# Patient Record
Sex: FEMALE | Race: WHITE | Employment: OTHER | URBAN - METROPOLITAN AREA
[De-identification: names, ages, dates, MRNs, and addresses within clinical notes are randomized per-mention and may not be internally consistent; named-entity substitution may affect disease eponyms.]

---

## 2018-01-01 ENCOUNTER — APPOINTMENT (OUTPATIENT)
Dept: GENERAL RADIOLOGY | Age: 68
End: 2018-01-01
Attending: EMERGENCY MEDICINE
Payer: MEDICARE

## 2018-01-01 ENCOUNTER — HOSPITAL ENCOUNTER (EMERGENCY)
Age: 68
Discharge: HOME | End: 2018-09-16
Attending: EMERGENCY MEDICINE
Payer: MEDICARE

## 2018-01-01 ENCOUNTER — APPOINTMENT (OUTPATIENT)
Dept: CT IMAGING | Age: 68
End: 2018-01-01
Attending: EMERGENCY MEDICINE
Payer: MEDICARE

## 2018-01-01 VITALS
OXYGEN SATURATION: 99 % | DIASTOLIC BLOOD PRESSURE: 76 MMHG | SYSTOLIC BLOOD PRESSURE: 134 MMHG | BODY MASS INDEX: 17.72 KG/M2 | RESPIRATION RATE: 18 BRPM | TEMPERATURE: 97.5 F | WEIGHT: 100 LBS | HEART RATE: 74 BPM | HEIGHT: 63 IN

## 2018-01-01 DIAGNOSIS — M54.2 NECK PAIN: ICD-10-CM

## 2018-01-01 DIAGNOSIS — S01.01XA LACERATION OF SCALP, INITIAL ENCOUNTER: Primary | ICD-10-CM

## 2018-01-01 DIAGNOSIS — S09.90XA CLOSED HEAD INJURY, INITIAL ENCOUNTER: ICD-10-CM

## 2018-01-01 PROCEDURE — 99282 EMERGENCY DEPT VISIT SF MDM: CPT | Performed by: EMERGENCY MEDICINE

## 2018-01-01 PROCEDURE — 70450 CT HEAD/BRAIN W/O DYE: CPT

## 2018-01-01 PROCEDURE — 72040 X-RAY EXAM NECK SPINE 2-3 VW: CPT

## 2018-01-01 RX ORDER — METOPROLOL TARTRATE 25 MG/1
12.5 TABLET, FILM COATED ORAL 2 TIMES DAILY
COMMUNITY

## 2018-09-16 NOTE — ED NOTES
Patient  at bedside stating he is ready to go and just needs help getting patient into car. Patient was assisted into wheelchair and  rolled patient from facility, did not wait for discharge paperwork.

## 2018-09-16 NOTE — DISCHARGE INSTRUCTIONS
Head Injury: After Your Visit to the Emergency Room  Your Care Instructions  You were seen in the emergency room for a head injury. Have another adult watch you closely for the next 24 hours. Ask the person to watch for signs that your head injury is getting worse, and make sure that he or she knows what to do if these signs appear. Even though you have been released from the emergency room, you still need to watch for any problems. The doctor carefully checked you. But sometimes problems can develop later. If you have new symptoms, or if your symptoms do not get better, return to the emergency room or call your doctor right away. A concussion means you hit your head hard enough to injure your brain. If you had a concussion, you may have symptoms that last for a few days to months. You may have changes in how well you think, concentrate, or remember; changes in your sleep patterns; or other symptoms. Although this is common after a concussion, any symptoms that are new or that are getting worse could be signs of a more serious problem. A visit to the emergency room is only one step in your treatment. Even if you feel better, you still need to do what your doctor recommends, such as going to all suggested follow-up appointments and taking medicines exactly as directed. This will help you recover and help prevent future problems. How can you care for yourself at home? · Take it easy for the next few days, or longer if you are not feeling well. Do not try to do too much. · Get plenty of sleep at night. Try to rest during the day. · Ask your doctor if you can take an over-the-counter pain medicine, such as acetaminophen (Tylenol), ibuprofen (Advil, Motrin), or naproxen (Aleve). Read and follow all instructions on the label. Do not take two or more pain medicines at the same time unless the doctor told you to. Many pain medicines have acetaminophen, which is Tylenol.  Too much acetaminophen (Tylenol) can be harmful. · Put ice or a cold pack on the area for 10 to 20 minutes at a time. Put a thin cloth between the ice and your skin. · Do not drink any alcohol for 24 hours or until your doctor tells you it is okay. · Avoid activities that could lead to another head injury. Avoid contact sports until your doctor says that you can do them. An athlete should never go back into a game after a head injury. · Until your doctor says it is okay, do not drive or do other things that require you to be alert. When should you call for help? Call 911 if:  · You have twitching, jerking, or a seizure. · You passed out (lost consciousness). · You have other symptoms that you think are a medical emergency. Return to the emergency room now if:  · You continue to vomit for more than 2 hours, or you have new vomiting. · You have clear or bloody fluid coming from your nose or ears. · The person checking on you has a hard time waking you. · You are confused, cannot think clearly, or do not know where you are. · You have trouble walking or talking. · You have new weakness or numbness in any part of your body. · You have bruises around both eyes (\"raccoon eyes\"). Call your doctor today if:  · Your headaches get worse. Where can you learn more? Go to [email or web address]  Enter C324 in the search box to learn more about \"Head Injury: After Your Visit to the Emergency Room. \"   © 8868-5846 [employer], [employer]. Care instructions adapted under license by Dav [employer] (which disclaims liability or warranty for this information). This care instruction is for use with your licensed healthcare professional. If you have questions about a medical condition or this instruction, always ask your healthcare professional. [place] [address] any warranty or liability for your use of this information. Content Version: 9.4.59715;  Last Revised: July 27, 2010        Xr Spine Cerv Pa Lat [place] 3 V Max    Result Date: 9/16/2018  Cervical spine Clinical indication: Acute moderate pain after fall injury. Multiple sclerosis. Comparison: Thoracic spinal 11/10/2014 correlation Technique: 5 views. Technologist reports best possible imaging given some patient difficulty with positioning. Findings: There is no evidence of fracture, dislocation, or erosion. The prevertebral soft tissues are normal. The vertebral heights, disc spaces, alignment are maintained. Mild multilevel degenerative changes are not unusual for age. The partially imaged lung apices are clear. There is partial visualization of left side pacemaker. Overall bone density is likely low. IMPRESSION: No acute osseous abnormality. Ct Head Wo Cont    Result Date: 9/16/2018  Noncontrast head CT Clinical Indication: Acute fall with frontal head and facial pain, abrasions, lacerations. History of brain surgery for meningioma. Technique: Noncontrast axial images were obtained through the brain. All CT scans at this location are performed using dose modulation techniques as appropriate including the following: Automated exposure control, adjustment of the MA and/or kV according to patient's size, or use of iterative reconstruction technique. Comparison: None available Findings: There is no acute intracranial hemorrhage, hydrocephalus, intra-axial mass, or mass-effect. A 3.5 cm smooth well-defined area of peripheral low attenuation in the anterior left frontal lobe subjacent to a craniotomy defect. There is no CT evidence of acute large artery territorial infarction or abnormal extra-axial fluid collection. Mild generalized atrophy is noted. There are scattered mild areas of nonspecific low attenuation involving supratentorial white matter. The mastoid air cells and paranasal sinuses are clear where imaged. No displaced skull fractures are present. Small area of frontal scalp soft tissue swelling is noted. Impression: 1. No acute hemorrhage.  2. Left frontal peripheral hypodensity and craniotomy, likely sequelae of reported meningioma resection. No prior imaging is available to ensure stability. 3. Atrophy, mild chronic microvascular ischemic changes.

## 2018-09-16 NOTE — ED TRIAGE NOTES
Patient arrives from home, via EMS, complaining of fall. Patient was sitting on her toilet, when she fell forward hitting her face on the wall in front of her, causing laceration and abrasions. Patient denies losing consciousness. Also reports pain to left knee and left shoulder pain. Denies use of blood thinners.

## 2018-10-17 NOTE — ED PROVIDER NOTES
68-year-old female fell forward and struck her head No LOC No confusion or vomiting Happened shortly before arrival 
 
History from the patient and her  Fall Pertinent negatives include no nausea and no vomiting. Past Medical History:  
Diagnosis Date  Ill-defined condition   
 muscular dystrophy  Ill-defined condition Atrial fibrilation Past Surgical History:  
Procedure Laterality Date  ABDOMEN SURGERY PROC UNLISTED    
 laparotomy  HX  SECTION    
 HX ORTHOPAEDIC    
 left ankle, neck  HX OTHER SURGICAL    
 parotid  surgury  NEUROLOGICAL PROCEDURE UNLISTED    
 meningioma History reviewed. No pertinent family history. Social History Socioeconomic History  Marital status:  Spouse name: Not on file  Number of children: Not on file  Years of education: Not on file  Highest education level: Not on file Social Needs  Financial resource strain: Not on file  Food insecurity - worry: Not on file  Food insecurity - inability: Not on file  Transportation needs - medical: Not on file  Transportation needs - non-medical: Not on file Occupational History  Not on file Tobacco Use  Smoking status: Not on file Substance and Sexual Activity  Alcohol use: No  
 Drug use: No  
 Sexual activity: Not on file Other Topics Concern  Not on file Social History Narrative  Not on file ALLERGIES: Morphine Review of Systems Constitutional: Negative. Respiratory: Negative for shortness of breath. Cardiovascular: Negative for chest pain. Gastrointestinal: Negative for nausea and vomiting. Vitals:  
 18 3830 BP: 134/76 Pulse: 74 Resp: 18 Temp: 97.5 °F (36.4 °C) SpO2: 99% Weight: 45.4 kg (100 lb) Height: 5' 3\" (1.6 m) Physical Exam  
Constitutional: She appears well-developed and well-nourished. HENT:  
llaceration scalp Eyes: EOM are normal. Pupils are equal, round, and reactive to light. Cardiovascular: Normal rate and regular rhythm. Pulmonary/Chest: No respiratory distress. Musculoskeletal: She exhibits no deformity. Neurological: She is alert. Skin: Skin is warm. MDM Number of Diagnoses or Management Options Closed head injury, initial encounter:  
Laceration of scalp, initial encounter:  
Neck pain:  
Diagnosis management comments: Patient with full head injury. Scalp laceration CT is performed. X-ray of the cervical spine CT and x-ray are negative for fracture Laceration closed-- Anesthesia with 1% lidocaine. Amount and/or Complexity of Data Reviewed Tests in the radiology section of CPT®: reviewed 
 
 
 discharge home Procedures

## 2019-01-01 ENCOUNTER — APPOINTMENT (OUTPATIENT)
Dept: GENERAL RADIOLOGY | Age: 69
DRG: 070 | End: 2019-01-01
Attending: INTERNAL MEDICINE
Payer: MEDICARE

## 2019-01-01 ENCOUNTER — APPOINTMENT (OUTPATIENT)
Dept: CT IMAGING | Age: 69
DRG: 070 | End: 2019-01-01
Attending: INTERNAL MEDICINE
Payer: MEDICARE

## 2019-01-01 ENCOUNTER — APPOINTMENT (OUTPATIENT)
Dept: MRI IMAGING | Age: 69
DRG: 070 | End: 2019-01-01
Attending: NURSE PRACTITIONER
Payer: MEDICARE

## 2019-01-01 ENCOUNTER — HOSPITAL ENCOUNTER (INPATIENT)
Age: 69
LOS: 5 days | DRG: 070 | End: 2019-06-13
Attending: STUDENT IN AN ORGANIZED HEALTH CARE EDUCATION/TRAINING PROGRAM | Admitting: INTERNAL MEDICINE
Payer: MEDICARE

## 2019-01-01 ENCOUNTER — APPOINTMENT (OUTPATIENT)
Dept: CT IMAGING | Age: 69
DRG: 070 | End: 2019-01-01
Attending: STUDENT IN AN ORGANIZED HEALTH CARE EDUCATION/TRAINING PROGRAM
Payer: MEDICARE

## 2019-01-01 ENCOUNTER — APPOINTMENT (OUTPATIENT)
Dept: GENERAL RADIOLOGY | Age: 69
DRG: 070 | End: 2019-01-01
Attending: STUDENT IN AN ORGANIZED HEALTH CARE EDUCATION/TRAINING PROGRAM
Payer: MEDICARE

## 2019-01-01 VITALS
TEMPERATURE: 98.3 F | DIASTOLIC BLOOD PRESSURE: 94 MMHG | WEIGHT: 125.88 LBS | HEART RATE: 98 BPM | BODY MASS INDEX: 22.3 KG/M2 | OXYGEN SATURATION: 99 % | HEIGHT: 63 IN | SYSTOLIC BLOOD PRESSURE: 164 MMHG | RESPIRATION RATE: 34 BRPM

## 2019-01-01 DIAGNOSIS — I46.9 CARDIAC ARREST (HCC): ICD-10-CM

## 2019-01-01 DIAGNOSIS — Z71.89 ADVANCED DIRECTIVES, COUNSELING/DISCUSSION: ICD-10-CM

## 2019-01-01 DIAGNOSIS — Z51.5 ENCOUNTER FOR PALLIATIVE CARE: ICD-10-CM

## 2019-01-01 DIAGNOSIS — R56.9 SEIZURE (HCC): ICD-10-CM

## 2019-01-01 DIAGNOSIS — G80.8 OTHER CEREBRAL PALSY (HCC): Chronic | ICD-10-CM

## 2019-01-01 DIAGNOSIS — G93.89 BRAIN MASS: ICD-10-CM

## 2019-01-01 DIAGNOSIS — J98.2 PNEUMOMEDIASTINUM (HCC): ICD-10-CM

## 2019-01-01 DIAGNOSIS — R41.82 ALTERED MENTAL STATUS, UNSPECIFIED ALTERED MENTAL STATUS TYPE: Primary | ICD-10-CM

## 2019-01-01 DIAGNOSIS — J93.9 PNEUMOTHORAX ON RIGHT: ICD-10-CM

## 2019-01-01 DIAGNOSIS — J96.01 ACUTE RESPIRATORY FAILURE WITH HYPOXIA (HCC): ICD-10-CM

## 2019-01-01 DIAGNOSIS — G71.11 MYOTONIC DYSTROPHY (HCC): ICD-10-CM

## 2019-01-01 DIAGNOSIS — R53.81 DEBILITY: ICD-10-CM

## 2019-01-01 DIAGNOSIS — G93.40 ACUTE ENCEPHALOPATHY: ICD-10-CM

## 2019-01-01 DIAGNOSIS — J96.00 ACUTE RESPIRATORY FAILURE, UNSPECIFIED WHETHER WITH HYPOXIA OR HYPERCAPNIA (HCC): ICD-10-CM

## 2019-01-01 DIAGNOSIS — I95.9 HYPOTENSION, UNSPECIFIED HYPOTENSION TYPE: ICD-10-CM

## 2019-01-01 LAB
ALBUMIN SERPL-MCNC: 3.2 G/DL (ref 3.2–4.6)
ALBUMIN/GLOB SERPL: 1 {RATIO} (ref 1.2–3.5)
ALP SERPL-CCNC: 61 U/L (ref 50–136)
ALT SERPL-CCNC: 15 U/L (ref 12–65)
ANION GAP SERPL CALC-SCNC: 10 MMOL/L (ref 7–16)
ANION GAP SERPL CALC-SCNC: 11 MMOL/L (ref 7–16)
ANION GAP SERPL CALC-SCNC: 11 MMOL/L (ref 7–16)
ANION GAP SERPL CALC-SCNC: 12 MMOL/L (ref 7–16)
ANION GAP SERPL CALC-SCNC: 13 MMOL/L (ref 7–16)
ANION GAP SERPL CALC-SCNC: 5 MMOL/L (ref 7–16)
ANION GAP SERPL CALC-SCNC: 7 MMOL/L (ref 7–16)
ARTERIAL PATENCY WRIST A: ABNORMAL
ARTERIAL PATENCY WRIST A: ABNORMAL
ARTERIAL PATENCY WRIST A: YES
AST SERPL-CCNC: 18 U/L (ref 15–37)
ATRIAL RATE: 122 BPM
ATRIAL RATE: 159 BPM
ATRIAL RATE: 416 BPM
BASE DEFICIT BLD-SCNC: 10 MMOL/L
BASE DEFICIT BLD-SCNC: 11 MMOL/L
BASE DEFICIT BLD-SCNC: 2 MMOL/L
BASE DEFICIT BLD-SCNC: 6 MMOL/L
BASE DEFICIT BLD-SCNC: 8 MMOL/L
BASE DEFICIT BLD-SCNC: 8 MMOL/L
BASOPHILS # BLD: 0 K/UL (ref 0–0.2)
BASOPHILS # BLD: 0.1 K/UL (ref 0–0.2)
BASOPHILS NFR BLD: 0 % (ref 0–2)
BASOPHILS NFR BLD: 1 % (ref 0–2)
BDY SITE: ABNORMAL
BILIRUB SERPL-MCNC: 0.6 MG/DL (ref 0.2–1.1)
BODY TEMPERATURE: 98.6
BUN SERPL-MCNC: 10 MG/DL (ref 8–23)
BUN SERPL-MCNC: 10 MG/DL (ref 8–23)
BUN SERPL-MCNC: 11 MG/DL (ref 8–23)
BUN SERPL-MCNC: 11 MG/DL (ref 8–23)
BUN SERPL-MCNC: 14 MG/DL (ref 8–23)
BUN SERPL-MCNC: 15 MG/DL (ref 8–23)
BUN SERPL-MCNC: 8 MG/DL (ref 8–23)
CALCIUM SERPL-MCNC: 7.9 MG/DL (ref 8.3–10.4)
CALCIUM SERPL-MCNC: 8 MG/DL (ref 8.3–10.4)
CALCIUM SERPL-MCNC: 8.3 MG/DL (ref 8.3–10.4)
CALCIUM SERPL-MCNC: 8.4 MG/DL (ref 8.3–10.4)
CALCIUM SERPL-MCNC: 8.5 MG/DL (ref 8.3–10.4)
CALCIUM SERPL-MCNC: 8.7 MG/DL (ref 8.3–10.4)
CALCIUM SERPL-MCNC: 9.4 MG/DL (ref 8.3–10.4)
CALCULATED P AXIS, ECG09: 106 DEGREES
CALCULATED R AXIS, ECG10: -125 DEGREES
CALCULATED R AXIS, ECG10: -61 DEGREES
CALCULATED R AXIS, ECG10: 54 DEGREES
CALCULATED T AXIS, ECG11: 102 DEGREES
CALCULATED T AXIS, ECG11: 51 DEGREES
CALCULATED T AXIS, ECG11: 92 DEGREES
CHLORIDE SERPL-SCNC: 105 MMOL/L (ref 98–107)
CHLORIDE SERPL-SCNC: 110 MMOL/L (ref 98–107)
CHLORIDE SERPL-SCNC: 111 MMOL/L (ref 98–107)
CHLORIDE SERPL-SCNC: 112 MMOL/L (ref 98–107)
CHLORIDE SERPL-SCNC: 114 MMOL/L (ref 98–107)
CO2 BLD-SCNC: 13 MMOL/L
CO2 BLD-SCNC: 15 MMOL/L
CO2 BLD-SCNC: 16 MMOL/L
CO2 BLD-SCNC: 16 MMOL/L
CO2 BLD-SCNC: 19 MMOL/L
CO2 BLD-SCNC: 19 MMOL/L
CO2 SERPL-SCNC: 17 MMOL/L (ref 21–32)
CO2 SERPL-SCNC: 18 MMOL/L (ref 21–32)
CO2 SERPL-SCNC: 19 MMOL/L (ref 21–32)
CO2 SERPL-SCNC: 25 MMOL/L (ref 21–32)
CO2 SERPL-SCNC: 29 MMOL/L (ref 21–32)
COLLECT TIME,HTIME: 1554
COLLECT TIME,HTIME: 247
COLLECT TIME,HTIME: 309
COLLECT TIME,HTIME: 355
COLLECT TIME,HTIME: 405
COLLECT TIME,HTIME: 513
CREAT SERPL-MCNC: 0.16 MG/DL (ref 0.6–1)
CREAT SERPL-MCNC: 0.18 MG/DL (ref 0.6–1)
CREAT SERPL-MCNC: 0.18 MG/DL (ref 0.6–1)
CREAT SERPL-MCNC: 0.25 MG/DL (ref 0.6–1)
CREAT SERPL-MCNC: 0.27 MG/DL (ref 0.6–1)
CREAT SERPL-MCNC: 0.32 MG/DL (ref 0.6–1)
CREAT SERPL-MCNC: <0.15 MG/DL (ref 0.6–1)
DIAGNOSIS, 93000: NORMAL
DIFFERENTIAL METHOD BLD: ABNORMAL
DIFFERENTIAL METHOD BLD: NORMAL
EOSINOPHIL # BLD: 0 K/UL (ref 0–0.8)
EOSINOPHIL # BLD: 0.1 K/UL (ref 0–0.8)
EOSINOPHIL NFR BLD: 0 % (ref 0.5–7.8)
EOSINOPHIL NFR BLD: 1 % (ref 0.5–7.8)
ERYTHROCYTE [DISTWIDTH] IN BLOOD BY AUTOMATED COUNT: 14 % (ref 11.9–14.6)
ERYTHROCYTE [DISTWIDTH] IN BLOOD BY AUTOMATED COUNT: 14 % (ref 11.9–14.6)
ERYTHROCYTE [DISTWIDTH] IN BLOOD BY AUTOMATED COUNT: 14.1 % (ref 11.9–14.6)
ERYTHROCYTE [DISTWIDTH] IN BLOOD BY AUTOMATED COUNT: 14.3 % (ref 11.9–14.6)
ERYTHROCYTE [DISTWIDTH] IN BLOOD BY AUTOMATED COUNT: 14.4 % (ref 11.9–14.6)
ERYTHROCYTE [DISTWIDTH] IN BLOOD BY AUTOMATED COUNT: 14.4 % (ref 11.9–14.6)
EXHALED MINUTE VOLUME, VE: 10 L/MIN
EXHALED MINUTE VOLUME, VE: 5.8 L/MIN
EXHALED MINUTE VOLUME, VE: 6.5 L/MIN
EXHALED MINUTE VOLUME, VE: 8 L/MIN
EXHALED MINUTE VOLUME, VE: 9 L/MIN
EXHALED MINUTE VOLUME, VE: 9.1 L/MIN
GAS FLOW.O2 O2 DELIVERY SYS: ABNORMAL L/MIN
GAS FLOW.O2 SETTING OXYMISER: 15 BPM
GAS FLOW.O2 SETTING OXYMISER: 18 BPM
GAS FLOW.O2 SETTING OXYMISER: 20 BPM
GLOBULIN SER CALC-MCNC: 3.2 G/DL (ref 2.3–3.5)
GLUCOSE BLD STRIP.AUTO-MCNC: 122 MG/DL (ref 65–100)
GLUCOSE BLD STRIP.AUTO-MCNC: 95 MG/DL (ref 65–100)
GLUCOSE SERPL-MCNC: 101 MG/DL (ref 65–100)
GLUCOSE SERPL-MCNC: 103 MG/DL (ref 65–100)
GLUCOSE SERPL-MCNC: 126 MG/DL (ref 65–100)
GLUCOSE SERPL-MCNC: 130 MG/DL (ref 65–100)
GLUCOSE SERPL-MCNC: 137 MG/DL (ref 65–100)
GLUCOSE SERPL-MCNC: 93 MG/DL (ref 65–100)
GLUCOSE SERPL-MCNC: 95 MG/DL (ref 65–100)
HCO3 BLD-SCNC: 12.8 MMOL/L (ref 22–26)
HCO3 BLD-SCNC: 14.5 MMOL/L (ref 22–26)
HCO3 BLD-SCNC: 14.9 MMOL/L (ref 22–26)
HCO3 BLD-SCNC: 15.1 MMOL/L (ref 22–26)
HCO3 BLD-SCNC: 17.7 MMOL/L (ref 22–26)
HCO3 BLD-SCNC: 18.8 MMOL/L (ref 22–26)
HCT VFR BLD AUTO: 32.7 % (ref 35.8–46.3)
HCT VFR BLD AUTO: 32.7 % (ref 35.8–46.3)
HCT VFR BLD AUTO: 34.5 % (ref 35.8–46.3)
HCT VFR BLD AUTO: 35.5 % (ref 35.8–46.3)
HCT VFR BLD AUTO: 39.2 % (ref 35.8–46.3)
HCT VFR BLD AUTO: 44.6 % (ref 35.8–46.3)
HGB BLD-MCNC: 10.7 G/DL (ref 11.7–15.4)
HGB BLD-MCNC: 11 G/DL (ref 11.7–15.4)
HGB BLD-MCNC: 11.4 G/DL (ref 11.7–15.4)
HGB BLD-MCNC: 12 G/DL (ref 11.7–15.4)
HGB BLD-MCNC: 12.7 G/DL (ref 11.7–15.4)
HGB BLD-MCNC: 14.2 G/DL (ref 11.7–15.4)
IMM GRANULOCYTES # BLD AUTO: 0 K/UL (ref 0–0.5)
IMM GRANULOCYTES NFR BLD AUTO: 0 % (ref 0–5)
IMM GRANULOCYTES NFR BLD AUTO: 1 % (ref 0–5)
INR BLD: 0.9 (ref 0.9–1.2)
INSPIRATION.DURATION SETTING TIME VENT: 0.9 SEC
LACTATE BLD-SCNC: 1.04 MMOL/L (ref 0.5–1.9)
LYMPHOCYTES # BLD: 0.3 K/UL (ref 0.5–4.6)
LYMPHOCYTES # BLD: 0.3 K/UL (ref 0.5–4.6)
LYMPHOCYTES # BLD: 0.4 K/UL (ref 0.5–4.6)
LYMPHOCYTES # BLD: 0.8 K/UL (ref 0.5–4.6)
LYMPHOCYTES # BLD: 1.5 K/UL (ref 0.5–4.6)
LYMPHOCYTES # BLD: 2.4 K/UL (ref 0.5–4.6)
LYMPHOCYTES NFR BLD: 10 % (ref 13–44)
LYMPHOCYTES NFR BLD: 14 % (ref 13–44)
LYMPHOCYTES NFR BLD: 27 % (ref 13–44)
LYMPHOCYTES NFR BLD: 4 % (ref 13–44)
LYMPHOCYTES NFR BLD: 4 % (ref 13–44)
LYMPHOCYTES NFR BLD: 7 % (ref 13–44)
MAGNESIUM SERPL-MCNC: 1.6 MG/DL (ref 1.8–2.4)
MAGNESIUM SERPL-MCNC: 2 MG/DL (ref 1.8–2.4)
MAGNESIUM SERPL-MCNC: 2 MG/DL (ref 1.8–2.4)
MAGNESIUM SERPL-MCNC: 2.1 MG/DL (ref 1.8–2.4)
MAGNESIUM SERPL-MCNC: 2.6 MG/DL (ref 1.8–2.4)
MCH RBC QN AUTO: 28.6 PG (ref 26.1–32.9)
MCH RBC QN AUTO: 28.7 PG (ref 26.1–32.9)
MCH RBC QN AUTO: 28.8 PG (ref 26.1–32.9)
MCH RBC QN AUTO: 29 PG (ref 26.1–32.9)
MCH RBC QN AUTO: 29.3 PG (ref 26.1–32.9)
MCH RBC QN AUTO: 29.3 PG (ref 26.1–32.9)
MCHC RBC AUTO-ENTMCNC: 31.8 G/DL (ref 31.4–35)
MCHC RBC AUTO-ENTMCNC: 32.4 G/DL (ref 31.4–35)
MCHC RBC AUTO-ENTMCNC: 32.7 G/DL (ref 31.4–35)
MCHC RBC AUTO-ENTMCNC: 33 G/DL (ref 31.4–35)
MCHC RBC AUTO-ENTMCNC: 33.6 G/DL (ref 31.4–35)
MCHC RBC AUTO-ENTMCNC: 33.8 G/DL (ref 31.4–35)
MCV RBC AUTO: 85.2 FL (ref 79.6–97.8)
MCV RBC AUTO: 86.8 FL (ref 79.6–97.8)
MCV RBC AUTO: 87.1 FL (ref 79.6–97.8)
MCV RBC AUTO: 87.7 FL (ref 79.6–97.8)
MCV RBC AUTO: 89.5 FL (ref 79.6–97.8)
MCV RBC AUTO: 92 FL (ref 79.6–97.8)
MONOCYTES # BLD: 0.1 K/UL (ref 0.1–1.3)
MONOCYTES # BLD: 0.3 K/UL (ref 0.1–1.3)
MONOCYTES # BLD: 0.3 K/UL (ref 0.1–1.3)
MONOCYTES # BLD: 0.4 K/UL (ref 0.1–1.3)
MONOCYTES # BLD: 0.5 K/UL (ref 0.1–1.3)
MONOCYTES # BLD: 0.9 K/UL (ref 0.1–1.3)
MONOCYTES NFR BLD: 1 % (ref 4–12)
MONOCYTES NFR BLD: 4 % (ref 4–12)
MONOCYTES NFR BLD: 4 % (ref 4–12)
MONOCYTES NFR BLD: 5 % (ref 4–12)
MONOCYTES NFR BLD: 7 % (ref 4–12)
MONOCYTES NFR BLD: 9 % (ref 4–12)
NEUTS SEG # BLD: 5.6 K/UL (ref 1.7–8.2)
NEUTS SEG # BLD: 5.7 K/UL (ref 1.7–8.2)
NEUTS SEG # BLD: 5.9 K/UL (ref 1.7–8.2)
NEUTS SEG # BLD: 6.1 K/UL (ref 1.7–8.2)
NEUTS SEG # BLD: 7.2 K/UL (ref 1.7–8.2)
NEUTS SEG # BLD: 8.3 K/UL (ref 1.7–8.2)
NEUTS SEG NFR BLD: 67 % (ref 43–78)
NEUTS SEG NFR BLD: 77 % (ref 43–78)
NEUTS SEG NFR BLD: 82 % (ref 43–78)
NEUTS SEG NFR BLD: 91 % (ref 43–78)
NEUTS SEG NFR BLD: 92 % (ref 43–78)
NEUTS SEG NFR BLD: 92 % (ref 43–78)
NRBC # BLD: 0 K/UL (ref 0–0.2)
O2/TOTAL GAS SETTING VFR VENT: 100 %
O2/TOTAL GAS SETTING VFR VENT: 25 %
O2/TOTAL GAS SETTING VFR VENT: 30 %
O2/TOTAL GAS SETTING VFR VENT: 30 %
O2/TOTAL GAS SETTING VFR VENT: 50 %
O2/TOTAL GAS SETTING VFR VENT: 75 %
P-R INTERVAL, ECG05: 136 MS
PCO2 BLD: 21.5 MMHG (ref 35–45)
PCO2 BLD: 21.9 MMHG (ref 35–45)
PCO2 BLD: 22 MMHG (ref 35–45)
PCO2 BLD: 22.8 MMHG (ref 35–45)
PCO2 BLD: 28.1 MMHG (ref 35–45)
PCO2 BLD: 29.2 MMHG (ref 35–45)
PEEP RESPIRATORY: 10 CMH2O
PEEP RESPIRATORY: 8 CMH2O
PH BLD: 7.32 [PH] (ref 7.35–7.45)
PH BLD: 7.37 [PH] (ref 7.35–7.45)
PH BLD: 7.41 [PH] (ref 7.35–7.45)
PH BLD: 7.42 [PH] (ref 7.35–7.45)
PH BLD: 7.44 [PH] (ref 7.35–7.45)
PH BLD: 7.54 [PH] (ref 7.35–7.45)
PHOSPHATE SERPL-MCNC: 1.4 MG/DL (ref 2.3–3.7)
PLATELET # BLD AUTO: 168 K/UL (ref 150–450)
PLATELET # BLD AUTO: 207 K/UL (ref 150–450)
PLATELET # BLD AUTO: 227 K/UL (ref 150–450)
PLATELET # BLD AUTO: 237 K/UL (ref 150–450)
PLATELET # BLD AUTO: 287 K/UL (ref 150–450)
PLATELET # BLD AUTO: 347 K/UL (ref 150–450)
PMV BLD AUTO: 10 FL (ref 9.4–12.3)
PMV BLD AUTO: 10.2 FL (ref 9.4–12.3)
PMV BLD AUTO: 10.3 FL (ref 9.4–12.3)
PMV BLD AUTO: 10.3 FL (ref 9.4–12.3)
PMV BLD AUTO: 10.6 FL (ref 9.4–12.3)
PMV BLD AUTO: 9.7 FL (ref 9.4–12.3)
PO2 BLD: 114 MMHG (ref 75–100)
PO2 BLD: 133 MMHG (ref 75–100)
PO2 BLD: 137 MMHG (ref 75–100)
PO2 BLD: 207 MMHG (ref 75–100)
PO2 BLD: 330 MMHG (ref 75–100)
PO2 BLD: 403 MMHG (ref 75–100)
POTASSIUM SERPL-SCNC: 3.2 MMOL/L (ref 3.5–5.1)
POTASSIUM SERPL-SCNC: 3.4 MMOL/L (ref 3.5–5.1)
POTASSIUM SERPL-SCNC: 3.6 MMOL/L (ref 3.5–5.1)
POTASSIUM SERPL-SCNC: 3.8 MMOL/L (ref 3.5–5.1)
POTASSIUM SERPL-SCNC: 3.8 MMOL/L (ref 3.5–5.1)
POTASSIUM SERPL-SCNC: 3.9 MMOL/L (ref 3.5–5.1)
POTASSIUM SERPL-SCNC: 4 MMOL/L (ref 3.5–5.1)
PRESSURE SUPPORT SETTING VENT: 8 CMH2O
PROCALCITONIN SERPL-MCNC: <0.1 NG/ML
PROT SERPL-MCNC: 6.4 G/DL (ref 6.3–8.2)
PT BLD: 11.4 SECS (ref 9.6–11.6)
Q-T INTERVAL, ECG07: 386 MS
Q-T INTERVAL, ECG07: 388 MS
Q-T INTERVAL, ECG07: 448 MS
QRS DURATION, ECG06: 110 MS
QRS DURATION, ECG06: 164 MS
QRS DURATION, ECG06: 188 MS
QTC CALCULATION (BEZET), ECG08: 453 MS
QTC CALCULATION (BEZET), ECG08: 483 MS
QTC CALCULATION (BEZET), ECG08: 539 MS
RBC # BLD AUTO: 3.73 M/UL (ref 4.05–5.2)
RBC # BLD AUTO: 3.84 M/UL (ref 4.05–5.2)
RBC # BLD AUTO: 3.96 M/UL (ref 4.05–5.2)
RBC # BLD AUTO: 4.09 M/UL (ref 4.05–5.2)
RBC # BLD AUTO: 4.38 M/UL (ref 4.05–5.2)
RBC # BLD AUTO: 4.85 M/UL (ref 4.05–5.2)
SAO2 % BLD: 100 % (ref 95–98)
SAO2 % BLD: 99 % (ref 95–98)
SERVICE CMNT-IMP: ABNORMAL
SODIUM SERPL-SCNC: 139 MMOL/L (ref 136–145)
SODIUM SERPL-SCNC: 141 MMOL/L (ref 136–145)
SODIUM SERPL-SCNC: 142 MMOL/L (ref 136–145)
SODIUM SERPL-SCNC: 142 MMOL/L (ref 136–145)
SODIUM SERPL-SCNC: 143 MMOL/L (ref 136–145)
SPECIMEN TYPE: ABNORMAL
TOTAL RESP. RATE, ITRR: 16
TROPONIN I BLD-MCNC: 0.02 NG/ML (ref 0.02–0.05)
VENTILATION MODE VENT: ABNORMAL
VENTRICULAR RATE, ECG03: 116 BPM
VENTRICULAR RATE, ECG03: 70 BPM
VENTRICULAR RATE, ECG03: 83 BPM
VT SETTING VENT: 440 ML
VT SETTING VENT: 450 ML
WBC # BLD AUTO: 10.8 K/UL (ref 4.3–11.1)
WBC # BLD AUTO: 6.1 K/UL (ref 4.3–11.1)
WBC # BLD AUTO: 6.3 K/UL (ref 4.3–11.1)
WBC # BLD AUTO: 7.4 K/UL (ref 4.3–11.1)
WBC # BLD AUTO: 7.9 K/UL (ref 4.3–11.1)
WBC # BLD AUTO: 8.8 K/UL (ref 4.3–11.1)

## 2019-01-01 PROCEDURE — 71045 X-RAY EXAM CHEST 1 VIEW: CPT

## 2019-01-01 PROCEDURE — 74011250636 HC RX REV CODE- 250/636: Performed by: NURSE PRACTITIONER

## 2019-01-01 PROCEDURE — 99233 SBSQ HOSP IP/OBS HIGH 50: CPT | Performed by: INTERNAL MEDICINE

## 2019-01-01 PROCEDURE — 74011250636 HC RX REV CODE- 250/636: Performed by: INTERNAL MEDICINE

## 2019-01-01 PROCEDURE — 84484 ASSAY OF TROPONIN QUANT: CPT

## 2019-01-01 PROCEDURE — 77030020255 HC SOL INJ LR 1000ML BG

## 2019-01-01 PROCEDURE — 74011250637 HC RX REV CODE- 250/637: Performed by: INTERNAL MEDICINE

## 2019-01-01 PROCEDURE — 77030018798 HC PMP KT ENTRL FED COVD -A

## 2019-01-01 PROCEDURE — 4A00X4Z MEASUREMENT OF CENTRAL NERVOUS ELECTRICAL ACTIVITY, EXTERNAL APPROACH: ICD-10-PCS | Performed by: PSYCHIATRY & NEUROLOGY

## 2019-01-01 PROCEDURE — 74011250636 HC RX REV CODE- 250/636: Performed by: PSYCHIATRY & NEUROLOGY

## 2019-01-01 PROCEDURE — 80048 BASIC METABOLIC PNL TOTAL CA: CPT

## 2019-01-01 PROCEDURE — 74011000250 HC RX REV CODE- 250: Performed by: INTERNAL MEDICINE

## 2019-01-01 PROCEDURE — 99223 1ST HOSP IP/OBS HIGH 75: CPT | Performed by: NURSE PRACTITIONER

## 2019-01-01 PROCEDURE — 70553 MRI BRAIN STEM W/O & W/DYE: CPT

## 2019-01-01 PROCEDURE — 36600 WITHDRAWAL OF ARTERIAL BLOOD: CPT

## 2019-01-01 PROCEDURE — 99231 SBSQ HOSP IP/OBS SF/LOW 25: CPT | Performed by: NURSE PRACTITIONER

## 2019-01-01 PROCEDURE — 36415 COLL VENOUS BLD VENIPUNCTURE: CPT

## 2019-01-01 PROCEDURE — 85610 PROTHROMBIN TIME: CPT

## 2019-01-01 PROCEDURE — 94002 VENT MGMT INPAT INIT DAY: CPT

## 2019-01-01 PROCEDURE — 84100 ASSAY OF PHOSPHORUS: CPT

## 2019-01-01 PROCEDURE — 96374 THER/PROPH/DIAG INJ IV PUSH: CPT | Performed by: STUDENT IN AN ORGANIZED HEALTH CARE EDUCATION/TRAINING PROGRAM

## 2019-01-01 PROCEDURE — 80053 COMPREHEN METABOLIC PANEL: CPT

## 2019-01-01 PROCEDURE — 31720 CLEARANCE OF AIRWAYS: CPT

## 2019-01-01 PROCEDURE — 85025 COMPLETE CBC W/AUTO DIFF WBC: CPT

## 2019-01-01 PROCEDURE — 82803 BLOOD GASES ANY COMBINATION: CPT

## 2019-01-01 PROCEDURE — 74011000250 HC RX REV CODE- 250: Performed by: STUDENT IN AN ORGANIZED HEALTH CARE EDUCATION/TRAINING PROGRAM

## 2019-01-01 PROCEDURE — 83605 ASSAY OF LACTIC ACID: CPT

## 2019-01-01 PROCEDURE — 83735 ASSAY OF MAGNESIUM: CPT

## 2019-01-01 PROCEDURE — 99232 SBSQ HOSP IP/OBS MODERATE 35: CPT | Performed by: PSYCHIATRY & NEUROLOGY

## 2019-01-01 PROCEDURE — 93005 ELECTROCARDIOGRAM TRACING: CPT | Performed by: STUDENT IN AN ORGANIZED HEALTH CARE EDUCATION/TRAINING PROGRAM

## 2019-01-01 PROCEDURE — 95816 EEG AWAKE AND DROWSY: CPT | Performed by: PSYCHIATRY & NEUROLOGY

## 2019-01-01 PROCEDURE — 65620000000 HC RM CCU GENERAL

## 2019-01-01 PROCEDURE — 94003 VENT MGMT INPAT SUBQ DAY: CPT

## 2019-01-01 PROCEDURE — 99291 CRITICAL CARE FIRST HOUR: CPT | Performed by: PSYCHIATRY & NEUROLOGY

## 2019-01-01 PROCEDURE — 99232 SBSQ HOSP IP/OBS MODERATE 35: CPT | Performed by: INTERNAL MEDICINE

## 2019-01-01 PROCEDURE — 74011000258 HC RX REV CODE- 258: Performed by: INTERNAL MEDICINE

## 2019-01-01 PROCEDURE — 32551 INSERTION OF CHEST TUBE: CPT | Performed by: INTERNAL MEDICINE

## 2019-01-01 PROCEDURE — 77030005515 HC CATH URETH FOL14 BARD -B

## 2019-01-01 PROCEDURE — 0W9930Z DRAINAGE OF RIGHT PLEURAL CAVITY WITH DRAINAGE DEVICE, PERCUTANEOUS APPROACH: ICD-10-PCS | Performed by: INTERNAL MEDICINE

## 2019-01-01 PROCEDURE — 99232 SBSQ HOSP IP/OBS MODERATE 35: CPT | Performed by: NURSE PRACTITIONER

## 2019-01-01 PROCEDURE — 74011636320 HC RX REV CODE- 636/320: Performed by: INTERNAL MEDICINE

## 2019-01-01 PROCEDURE — 36592 COLLECT BLOOD FROM PICC: CPT

## 2019-01-01 PROCEDURE — 36573 INSJ PICC RS&I 5 YR+: CPT | Performed by: INTERNAL MEDICINE

## 2019-01-01 PROCEDURE — 99233 SBSQ HOSP IP/OBS HIGH 50: CPT | Performed by: NURSE PRACTITIONER

## 2019-01-01 PROCEDURE — 74011000258 HC RX REV CODE- 258: Performed by: PSYCHIATRY & NEUROLOGY

## 2019-01-01 PROCEDURE — 74011000250 HC RX REV CODE- 250

## 2019-01-01 PROCEDURE — 76450000000

## 2019-01-01 PROCEDURE — 99285 EMERGENCY DEPT VISIT HI MDM: CPT | Performed by: STUDENT IN AN ORGANIZED HEALTH CARE EDUCATION/TRAINING PROGRAM

## 2019-01-01 PROCEDURE — 96375 TX/PRO/DX INJ NEW DRUG ADDON: CPT | Performed by: STUDENT IN AN ORGANIZED HEALTH CARE EDUCATION/TRAINING PROGRAM

## 2019-01-01 PROCEDURE — 92950 HEART/LUNG RESUSCITATION CPR: CPT | Performed by: STUDENT IN AN ORGANIZED HEALTH CARE EDUCATION/TRAINING PROGRAM

## 2019-01-01 PROCEDURE — 77030020263 HC SOL INJ SOD CL0.9% LFCR 1000ML

## 2019-01-01 PROCEDURE — 02HV33Z INSERTION OF INFUSION DEVICE INTO SUPERIOR VENA CAVA, PERCUTANEOUS APPROACH: ICD-10-PCS | Performed by: INTERNAL MEDICINE

## 2019-01-01 PROCEDURE — 0BH17EZ INSERTION OF ENDOTRACHEAL AIRWAY INTO TRACHEA, VIA NATURAL OR ARTIFICIAL OPENING: ICD-10-PCS | Performed by: STUDENT IN AN ORGANIZED HEALTH CARE EDUCATION/TRAINING PROGRAM

## 2019-01-01 PROCEDURE — 99291 CRITICAL CARE FIRST HOUR: CPT | Performed by: INTERNAL MEDICINE

## 2019-01-01 PROCEDURE — C1751 CATH, INF, PER/CENT/MIDLINE: HCPCS

## 2019-01-01 PROCEDURE — 99233 SBSQ HOSP IP/OBS HIGH 50: CPT | Performed by: PSYCHIATRY & NEUROLOGY

## 2019-01-01 PROCEDURE — A9575 INJ GADOTERATE MEGLUMI 0.1ML: HCPCS | Performed by: INTERNAL MEDICINE

## 2019-01-01 PROCEDURE — 70450 CT HEAD/BRAIN W/O DYE: CPT

## 2019-01-01 PROCEDURE — 77030031476 HC EXCH HEAT MOISTW FLTR HALY -A

## 2019-01-01 PROCEDURE — 74011250636 HC RX REV CODE- 250/636: Performed by: STUDENT IN AN ORGANIZED HEALTH CARE EDUCATION/TRAINING PROGRAM

## 2019-01-01 PROCEDURE — 99223 1ST HOSP IP/OBS HIGH 75: CPT | Performed by: PSYCHIATRY & NEUROLOGY

## 2019-01-01 PROCEDURE — 94762 N-INVAS EAR/PLS OXIMTRY CONT: CPT

## 2019-01-01 PROCEDURE — 5A1955Z RESPIRATORY VENTILATION, GREATER THAN 96 CONSECUTIVE HOURS: ICD-10-PCS | Performed by: STUDENT IN AN ORGANIZED HEALTH CARE EDUCATION/TRAINING PROGRAM

## 2019-01-01 PROCEDURE — 32551 INSERTION OF CHEST TUBE: CPT

## 2019-01-01 PROCEDURE — 84145 PROCALCITONIN (PCT): CPT

## 2019-01-01 PROCEDURE — 82962 GLUCOSE BLOOD TEST: CPT

## 2019-01-01 PROCEDURE — 31500 INSERT EMERGENCY AIRWAY: CPT | Performed by: STUDENT IN AN ORGANIZED HEALTH CARE EDUCATION/TRAINING PROGRAM

## 2019-01-01 PROCEDURE — 99223 1ST HOSP IP/OBS HIGH 75: CPT | Performed by: INTERNAL MEDICINE

## 2019-01-01 PROCEDURE — 5A12012 PERFORMANCE OF CARDIAC OUTPUT, SINGLE, MANUAL: ICD-10-PCS | Performed by: STUDENT IN AN ORGANIZED HEALTH CARE EDUCATION/TRAINING PROGRAM

## 2019-01-01 PROCEDURE — 74011000250 HC RX REV CODE- 250: Performed by: NURSE PRACTITIONER

## 2019-01-01 PROCEDURE — 74018 RADEX ABDOMEN 1 VIEW: CPT

## 2019-01-01 PROCEDURE — 93005 ELECTROCARDIOGRAM TRACING: CPT | Performed by: INTERNAL MEDICINE

## 2019-01-01 PROCEDURE — 71250 CT THORAX DX C-: CPT

## 2019-01-01 PROCEDURE — 51702 INSERT TEMP BLADDER CATH: CPT | Performed by: STUDENT IN AN ORGANIZED HEALTH CARE EDUCATION/TRAINING PROGRAM

## 2019-01-01 RX ORDER — ONDANSETRON 2 MG/ML
4 INJECTION INTRAMUSCULAR; INTRAVENOUS
Status: DISCONTINUED | OUTPATIENT
Start: 2019-01-01 | End: 2019-01-01 | Stop reason: HOSPADM

## 2019-01-01 RX ORDER — SODIUM CHLORIDE 0.9 % (FLUSH) 0.9 %
10 SYRINGE (ML) INJECTION
Status: COMPLETED | OUTPATIENT
Start: 2019-01-01 | End: 2019-01-01

## 2019-01-01 RX ORDER — ROCURONIUM BROMIDE 10 MG/ML
50 INJECTION, SOLUTION INTRAVENOUS
Status: COMPLETED | OUTPATIENT
Start: 2019-01-01 | End: 2019-01-01

## 2019-01-01 RX ORDER — MORPHINE SULFATE 2 MG/ML
2 INJECTION, SOLUTION INTRAMUSCULAR; INTRAVENOUS
Status: DISCONTINUED | OUTPATIENT
Start: 2019-01-01 | End: 2019-01-01 | Stop reason: HOSPADM

## 2019-01-01 RX ORDER — DEXAMETHASONE SODIUM PHOSPHATE 4 MG/ML
4 INJECTION, SOLUTION INTRA-ARTICULAR; INTRALESIONAL; INTRAMUSCULAR; INTRAVENOUS; SOFT TISSUE EVERY 6 HOURS
Status: DISCONTINUED | OUTPATIENT
Start: 2019-01-01 | End: 2019-01-01

## 2019-01-01 RX ORDER — FLUCONAZOLE 100 MG/1
100 TABLET ORAL DAILY
Status: DISCONTINUED | OUTPATIENT
Start: 2019-01-01 | End: 2019-01-01

## 2019-01-01 RX ORDER — FENTANYL CITRATE 50 UG/ML
50 INJECTION, SOLUTION INTRAMUSCULAR; INTRAVENOUS ONCE
Status: COMPLETED | OUTPATIENT
Start: 2019-01-01 | End: 2019-01-01

## 2019-01-01 RX ORDER — SODIUM CHLORIDE, SODIUM LACTATE, POTASSIUM CHLORIDE, CALCIUM CHLORIDE 600; 310; 30; 20 MG/100ML; MG/100ML; MG/100ML; MG/100ML
100 INJECTION, SOLUTION INTRAVENOUS CONTINUOUS
Status: DISCONTINUED | OUTPATIENT
Start: 2019-01-01 | End: 2019-01-01

## 2019-01-01 RX ORDER — PROPOFOL 10 MG/ML
5 VIAL (ML) INTRAVENOUS
Status: DISCONTINUED | OUTPATIENT
Start: 2019-01-01 | End: 2019-01-01

## 2019-01-01 RX ORDER — GLYCOPYRROLATE 0.2 MG/ML
0.4 INJECTION INTRAMUSCULAR; INTRAVENOUS
Status: DISCONTINUED | OUTPATIENT
Start: 2019-01-01 | End: 2019-01-01 | Stop reason: HOSPADM

## 2019-01-01 RX ORDER — SODIUM CHLORIDE 0.9 % (FLUSH) 0.9 %
30 SYRINGE (ML) INJECTION AS NEEDED
Status: DISCONTINUED | OUTPATIENT
Start: 2019-01-01 | End: 2019-01-01 | Stop reason: HOSPADM

## 2019-01-01 RX ORDER — HEPARIN 100 UNIT/ML
900 SYRINGE INTRAVENOUS EVERY 8 HOURS
Status: DISCONTINUED | OUTPATIENT
Start: 2019-01-01 | End: 2019-01-01

## 2019-01-01 RX ORDER — GADOTERATE MEGLUMINE 376.9 MG/ML
9 INJECTION INTRAVENOUS
Status: COMPLETED | OUTPATIENT
Start: 2019-01-01 | End: 2019-01-01

## 2019-01-01 RX ORDER — LORAZEPAM 2 MG/ML
2 INJECTION INTRAMUSCULAR
Status: DISCONTINUED | OUTPATIENT
Start: 2019-01-01 | End: 2019-01-01 | Stop reason: HOSPADM

## 2019-01-01 RX ORDER — SODIUM CHLORIDE 0.9 % (FLUSH) 0.9 %
5-40 SYRINGE (ML) INJECTION EVERY 8 HOURS
Status: DISCONTINUED | OUTPATIENT
Start: 2019-01-01 | End: 2019-01-01

## 2019-01-01 RX ORDER — DILTIAZEM HYDROCHLORIDE 5 MG/ML
10 INJECTION INTRAVENOUS ONCE
Status: COMPLETED | OUTPATIENT
Start: 2019-01-01 | End: 2019-01-01

## 2019-01-01 RX ORDER — HEPARIN 100 UNIT/ML
900 SYRINGE INTRAVENOUS AS NEEDED
Status: DISCONTINUED | OUTPATIENT
Start: 2019-01-01 | End: 2019-01-01

## 2019-01-01 RX ORDER — MAGNESIUM SULFATE HEPTAHYDRATE 40 MG/ML
2 INJECTION, SOLUTION INTRAVENOUS ONCE
Status: COMPLETED | OUTPATIENT
Start: 2019-01-01 | End: 2019-01-01

## 2019-01-01 RX ORDER — METOPROLOL TARTRATE 25 MG/1
12.5 TABLET, FILM COATED ORAL EVERY 12 HOURS
Status: DISCONTINUED | OUTPATIENT
Start: 2019-01-01 | End: 2019-01-01

## 2019-01-01 RX ORDER — FLUCONAZOLE 100 MG/1
200 TABLET ORAL
Status: COMPLETED | OUTPATIENT
Start: 2019-01-01 | End: 2019-01-01

## 2019-01-01 RX ORDER — DILTIAZEM HYDROCHLORIDE 5 MG/ML
INJECTION INTRAVENOUS
Status: COMPLETED
Start: 2019-01-01 | End: 2019-01-01

## 2019-01-01 RX ORDER — LEVETIRACETAM 5 MG/ML
500 INJECTION INTRAVASCULAR EVERY 12 HOURS
Status: DISCONTINUED | OUTPATIENT
Start: 2019-01-01 | End: 2019-01-01 | Stop reason: SDUPTHER

## 2019-01-01 RX ORDER — ETOMIDATE 2 MG/ML
20 INJECTION INTRAVENOUS ONCE
Status: COMPLETED | OUTPATIENT
Start: 2019-01-01 | End: 2019-01-01

## 2019-01-01 RX ORDER — SODIUM CHLORIDE 0.9 % (FLUSH) 0.9 %
30 SYRINGE (ML) INJECTION EVERY 8 HOURS
Status: DISCONTINUED | OUTPATIENT
Start: 2019-01-01 | End: 2019-01-01 | Stop reason: HOSPADM

## 2019-01-01 RX ORDER — NOREPINEPHRINE BIT/0.9 % NACL 4MG/250ML
2-16 PLASTIC BAG, INJECTION (ML) INTRAVENOUS
Status: DISCONTINUED | OUTPATIENT
Start: 2019-01-01 | End: 2019-01-01

## 2019-01-01 RX ORDER — SODIUM CHLORIDE, SODIUM LACTATE, POTASSIUM CHLORIDE, CALCIUM CHLORIDE 600; 310; 30; 20 MG/100ML; MG/100ML; MG/100ML; MG/100ML
25-100 INJECTION, SOLUTION INTRAVENOUS
Status: DISCONTINUED | OUTPATIENT
Start: 2019-01-01 | End: 2019-01-01

## 2019-01-01 RX ORDER — PHENYLEPHRINE HCL IN 0.9% NACL 30MG/250ML
10-300 PLASTIC BAG, INJECTION (ML) INTRAVENOUS
Status: DISCONTINUED | OUTPATIENT
Start: 2019-01-01 | End: 2019-01-01

## 2019-01-01 RX ORDER — POTASSIUM CHLORIDE 14.9 MG/ML
20 INJECTION INTRAVENOUS
Status: COMPLETED | OUTPATIENT
Start: 2019-01-01 | End: 2019-01-01

## 2019-01-01 RX ORDER — SODIUM CHLORIDE 0.9 % (FLUSH) 0.9 %
5-40 SYRINGE (ML) INJECTION AS NEEDED
Status: DISCONTINUED | OUTPATIENT
Start: 2019-01-01 | End: 2019-01-01

## 2019-01-01 RX ORDER — DEXAMETHASONE SODIUM PHOSPHATE 100 MG/10ML
10 INJECTION INTRAMUSCULAR; INTRAVENOUS ONCE
Status: COMPLETED | OUTPATIENT
Start: 2019-01-01 | End: 2019-01-01

## 2019-01-01 RX ORDER — FENTANYL CITRATE 50 UG/ML
25 INJECTION, SOLUTION INTRAMUSCULAR; INTRAVENOUS
Status: DISCONTINUED | OUTPATIENT
Start: 2019-01-01 | End: 2019-01-01

## 2019-01-01 RX ORDER — MORPHINE SULFATE 2 MG/ML
4 INJECTION, SOLUTION INTRAMUSCULAR; INTRAVENOUS
Status: DISCONTINUED | OUTPATIENT
Start: 2019-01-01 | End: 2019-01-01 | Stop reason: HOSPADM

## 2019-01-01 RX ORDER — MORPHINE SULFATE 10 MG/ML
10 INJECTION, SOLUTION INTRAMUSCULAR; INTRAVENOUS
Status: DISCONTINUED | OUTPATIENT
Start: 2019-01-01 | End: 2019-01-01 | Stop reason: HOSPADM

## 2019-01-01 RX ADMIN — FENTANYL CITRATE 25 MCG: 50 INJECTION INTRAMUSCULAR; INTRAVENOUS at 00:24

## 2019-01-01 RX ADMIN — Medication 30 ML: at 14:24

## 2019-01-01 RX ADMIN — Medication 30 ML: at 21:40

## 2019-01-01 RX ADMIN — METOPROLOL TARTRATE 12.5 MG: 25 TABLET, FILM COATED ORAL at 20:18

## 2019-01-01 RX ADMIN — SODIUM CHLORIDE, SODIUM LACTATE, POTASSIUM CHLORIDE, AND CALCIUM CHLORIDE 100 ML/HR: 600; 310; 30; 20 INJECTION, SOLUTION INTRAVENOUS at 22:24

## 2019-01-01 RX ADMIN — Medication 30 ML: at 06:00

## 2019-01-01 RX ADMIN — ETOMIDATE 20 MG: 2 INJECTION, SOLUTION INTRAVENOUS at 14:20

## 2019-01-01 RX ADMIN — FAMOTIDINE 20 MG: 10 INJECTION, SOLUTION INTRAVENOUS at 20:32

## 2019-01-01 RX ADMIN — LORAZEPAM 2 MG: 2 INJECTION INTRAMUSCULAR; INTRAVENOUS at 14:32

## 2019-01-01 RX ADMIN — Medication 10 ML: at 20:32

## 2019-01-01 RX ADMIN — POTASSIUM CHLORIDE 20 MEQ: 200 INJECTION, SOLUTION INTRAVENOUS at 06:22

## 2019-01-01 RX ADMIN — SODIUM CHLORIDE, PRESERVATIVE FREE 900 UNITS: 5 INJECTION INTRAVENOUS at 06:05

## 2019-01-01 RX ADMIN — SODIUM CHLORIDE, PRESERVATIVE FREE 900 UNITS: 5 INJECTION INTRAVENOUS at 22:08

## 2019-01-01 RX ADMIN — ONDANSETRON 4 MG: 2 INJECTION INTRAMUSCULAR; INTRAVENOUS at 20:32

## 2019-01-01 RX ADMIN — DEXAMETHASONE SODIUM PHOSPHATE 4 MG: 4 INJECTION, SOLUTION INTRA-ARTICULAR; INTRALESIONAL; INTRAMUSCULAR; INTRAVENOUS; SOFT TISSUE at 15:00

## 2019-01-01 RX ADMIN — SODIUM CHLORIDE, SODIUM LACTATE, POTASSIUM CHLORIDE, AND CALCIUM CHLORIDE 100 ML/HR: 600; 310; 30; 20 INJECTION, SOLUTION INTRAVENOUS at 06:36

## 2019-01-01 RX ADMIN — FLUCONAZOLE 200 MG: 100 TABLET ORAL at 10:00

## 2019-01-01 RX ADMIN — GADOTERATE MEGLUMINE 9 ML: 376.9 INJECTION INTRAVENOUS at 10:29

## 2019-01-01 RX ADMIN — Medication 30 ML: at 13:50

## 2019-01-01 RX ADMIN — DIATRIZOATE MEGLUMINE AND DIATRIZOATE SODIUM 15 ML: 660; 100 LIQUID ORAL; RECTAL at 23:01

## 2019-01-01 RX ADMIN — SODIUM CHLORIDE 500 MG: 900 INJECTION, SOLUTION INTRAVENOUS at 22:05

## 2019-01-01 RX ADMIN — METOPROLOL TARTRATE 12.5 MG: 25 TABLET, FILM COATED ORAL at 10:00

## 2019-01-01 RX ADMIN — SODIUM CHLORIDE 500 MG: 900 INJECTION, SOLUTION INTRAVENOUS at 10:04

## 2019-01-01 RX ADMIN — NOREPINEPHRINE BITARTRATE 6 MCG/MIN: 1 INJECTION INTRAVENOUS at 06:00

## 2019-01-01 RX ADMIN — FAMOTIDINE 20 MG: 10 INJECTION, SOLUTION INTRAVENOUS at 09:59

## 2019-01-01 RX ADMIN — DEXAMETHASONE SODIUM PHOSPHATE 4 MG: 4 INJECTION, SOLUTION INTRA-ARTICULAR; INTRALESIONAL; INTRAMUSCULAR; INTRAVENOUS; SOFT TISSUE at 03:16

## 2019-01-01 RX ADMIN — GLYCOPYRROLATE 0.4 MG: 0.2 INJECTION, SOLUTION INTRAMUSCULAR; INTRAVENOUS at 13:47

## 2019-01-01 RX ADMIN — DEXAMETHASONE SODIUM PHOSPHATE 4 MG: 4 INJECTION, SOLUTION INTRA-ARTICULAR; INTRALESIONAL; INTRAMUSCULAR; INTRAVENOUS; SOFT TISSUE at 20:32

## 2019-01-01 RX ADMIN — DILTIAZEM HYDROCHLORIDE 10 MG: 5 INJECTION INTRAVENOUS at 17:45

## 2019-01-01 RX ADMIN — Medication 10 ML: at 16:42

## 2019-01-01 RX ADMIN — METOPROLOL TARTRATE 12.5 MG: 25 TABLET, FILM COATED ORAL at 10:04

## 2019-01-01 RX ADMIN — MORPHINE SULFATE 2 MG: 2 INJECTION, SOLUTION INTRAMUSCULAR; INTRAVENOUS at 13:50

## 2019-01-01 RX ADMIN — FAMOTIDINE 20 MG: 10 INJECTION, SOLUTION INTRAVENOUS at 09:48

## 2019-01-01 RX ADMIN — SODIUM CHLORIDE, SODIUM LACTATE, POTASSIUM CHLORIDE, AND CALCIUM CHLORIDE 100 ML/HR: 600; 310; 30; 20 INJECTION, SOLUTION INTRAVENOUS at 10:53

## 2019-01-01 RX ADMIN — Medication 30 ML: at 22:06

## 2019-01-01 RX ADMIN — Medication 30 ML: at 05:14

## 2019-01-01 RX ADMIN — SODIUM CHLORIDE, SODIUM LACTATE, POTASSIUM CHLORIDE, AND CALCIUM CHLORIDE 100 ML/HR: 600; 310; 30; 20 INJECTION, SOLUTION INTRAVENOUS at 12:47

## 2019-01-01 RX ADMIN — FAMOTIDINE 20 MG: 10 INJECTION, SOLUTION INTRAVENOUS at 20:18

## 2019-01-01 RX ADMIN — DILTIAZEM HYDROCHLORIDE 2.5 MG/HR: 5 INJECTION INTRAVENOUS at 17:59

## 2019-01-01 RX ADMIN — DEXAMETHASONE SODIUM PHOSPHATE 4 MG: 4 INJECTION, SOLUTION INTRA-ARTICULAR; INTRALESIONAL; INTRAMUSCULAR; INTRAVENOUS; SOFT TISSUE at 03:22

## 2019-01-01 RX ADMIN — Medication 30 ML: at 16:41

## 2019-01-01 RX ADMIN — Medication 10 ML: at 17:25

## 2019-01-01 RX ADMIN — POTASSIUM PHOSPHATE, MONOBASIC AND POTASSIUM PHOSPHATE, DIBASIC: 224; 236 INJECTION, SOLUTION INTRAVENOUS at 08:03

## 2019-01-01 RX ADMIN — SODIUM CHLORIDE, PRESERVATIVE FREE 900 UNITS: 5 INJECTION INTRAVENOUS at 05:20

## 2019-01-01 RX ADMIN — SODIUM CHLORIDE, PRESERVATIVE FREE 900 UNITS: 5 INJECTION INTRAVENOUS at 13:28

## 2019-01-01 RX ADMIN — DEXAMETHASONE SODIUM PHOSPHATE 4 MG: 4 INJECTION, SOLUTION INTRA-ARTICULAR; INTRALESIONAL; INTRAMUSCULAR; INTRAVENOUS; SOFT TISSUE at 10:00

## 2019-01-01 RX ADMIN — FLUCONAZOLE 100 MG: 100 TABLET ORAL at 09:49

## 2019-01-01 RX ADMIN — SODIUM CHLORIDE, PRESERVATIVE FREE 300 UNITS: 5 INJECTION INTRAVENOUS at 16:43

## 2019-01-01 RX ADMIN — SODIUM CHLORIDE, SODIUM LACTATE, POTASSIUM CHLORIDE, AND CALCIUM CHLORIDE 100 ML/HR: 600; 310; 30; 20 INJECTION, SOLUTION INTRAVENOUS at 20:30

## 2019-01-01 RX ADMIN — SODIUM CHLORIDE, PRESERVATIVE FREE 900 UNITS: 5 INJECTION INTRAVENOUS at 05:12

## 2019-01-01 RX ADMIN — DEXAMETHASONE SODIUM PHOSPHATE 4 MG: 4 INJECTION, SOLUTION INTRA-ARTICULAR; INTRALESIONAL; INTRAMUSCULAR; INTRAVENOUS; SOFT TISSUE at 02:44

## 2019-01-01 RX ADMIN — FAMOTIDINE 20 MG: 10 INJECTION, SOLUTION INTRAVENOUS at 23:50

## 2019-01-01 RX ADMIN — SODIUM CHLORIDE 500 MG: 900 INJECTION, SOLUTION INTRAVENOUS at 11:20

## 2019-01-01 RX ADMIN — SODIUM CHLORIDE, PRESERVATIVE FREE 900 UNITS: 5 INJECTION INTRAVENOUS at 22:05

## 2019-01-01 RX ADMIN — Medication 10 ML: at 05:27

## 2019-01-01 RX ADMIN — SODIUM CHLORIDE, PRESERVATIVE FREE 900 UNITS: 5 INJECTION INTRAVENOUS at 14:59

## 2019-01-01 RX ADMIN — SODIUM CHLORIDE 500 MG: 900 INJECTION, SOLUTION INTRAVENOUS at 10:01

## 2019-01-01 RX ADMIN — DEXAMETHASONE SODIUM PHOSPHATE 4 MG: 4 INJECTION, SOLUTION INTRA-ARTICULAR; INTRALESIONAL; INTRAMUSCULAR; INTRAVENOUS; SOFT TISSUE at 14:45

## 2019-01-01 RX ADMIN — DEXAMETHASONE SODIUM PHOSPHATE 4 MG: 4 INJECTION, SOLUTION INTRA-ARTICULAR; INTRALESIONAL; INTRAMUSCULAR; INTRAVENOUS; SOFT TISSUE at 20:18

## 2019-01-01 RX ADMIN — SODIUM CHLORIDE, SODIUM LACTATE, POTASSIUM CHLORIDE, AND CALCIUM CHLORIDE 100 ML/HR: 600; 310; 30; 20 INJECTION, SOLUTION INTRAVENOUS at 16:34

## 2019-01-01 RX ADMIN — Medication 10 ML: at 10:29

## 2019-01-01 RX ADMIN — Medication 30 ML: at 22:05

## 2019-01-01 RX ADMIN — SODIUM CHLORIDE, SODIUM LACTATE, POTASSIUM CHLORIDE, AND CALCIUM CHLORIDE 100 ML/HR: 600; 310; 30; 20 INJECTION, SOLUTION INTRAVENOUS at 02:34

## 2019-01-01 RX ADMIN — POTASSIUM CHLORIDE 20 MEQ: 200 INJECTION, SOLUTION INTRAVENOUS at 10:43

## 2019-01-01 RX ADMIN — SODIUM CHLORIDE, PRESERVATIVE FREE 900 UNITS: 5 INJECTION INTRAVENOUS at 14:45

## 2019-01-01 RX ADMIN — MAGNESIUM SULFATE HEPTAHYDRATE 2 G: 40 INJECTION, SOLUTION INTRAVENOUS at 19:44

## 2019-01-01 RX ADMIN — Medication 30 ML: at 14:59

## 2019-01-01 RX ADMIN — Medication 30 MCG/MIN: at 19:44

## 2019-01-01 RX ADMIN — DEXAMETHASONE SODIUM PHOSPHATE 4 MG: 4 INJECTION, SOLUTION INTRA-ARTICULAR; INTRALESIONAL; INTRAMUSCULAR; INTRAVENOUS; SOFT TISSUE at 08:38

## 2019-01-01 RX ADMIN — SODIUM CHLORIDE 500 MG: 900 INJECTION, SOLUTION INTRAVENOUS at 20:34

## 2019-01-01 RX ADMIN — FENTANYL CITRATE 50 MCG: 50 INJECTION INTRAMUSCULAR; INTRAVENOUS at 21:25

## 2019-01-01 RX ADMIN — SODIUM CHLORIDE 500 MG: 900 INJECTION, SOLUTION INTRAVENOUS at 20:18

## 2019-01-01 RX ADMIN — DEXAMETHASONE SODIUM PHOSPHATE 4 MG: 4 INJECTION, SOLUTION INTRA-ARTICULAR; INTRALESIONAL; INTRAMUSCULAR; INTRAVENOUS; SOFT TISSUE at 09:48

## 2019-01-01 RX ADMIN — ROCURONIUM BROMIDE 50 MG: 10 INJECTION, SOLUTION INTRAVENOUS at 14:20

## 2019-01-01 RX ADMIN — Medication 30 ML: at 04:20

## 2019-01-01 RX ADMIN — FAMOTIDINE 20 MG: 10 INJECTION, SOLUTION INTRAVENOUS at 08:38

## 2019-01-01 RX ADMIN — Medication 30 ML: at 13:28

## 2019-01-01 RX ADMIN — NOREPINEPHRINE BITARTRATE 2 MCG/MIN: 1 INJECTION INTRAVENOUS at 16:34

## 2019-01-01 RX ADMIN — SODIUM CHLORIDE, SODIUM LACTATE, POTASSIUM CHLORIDE, AND CALCIUM CHLORIDE 55 ML/HR: 600; 310; 30; 20 INJECTION, SOLUTION INTRAVENOUS at 17:05

## 2019-01-01 RX ADMIN — DEXAMETHASONE SODIUM PHOSPHATE 4 MG: 4 INJECTION, SOLUTION INTRA-ARTICULAR; INTRALESIONAL; INTRAMUSCULAR; INTRAVENOUS; SOFT TISSUE at 22:05

## 2019-01-01 RX ADMIN — SODIUM CHLORIDE 1000 ML: 900 INJECTION, SOLUTION INTRAVENOUS at 15:25

## 2019-01-01 RX ADMIN — Medication 30 ML: at 05:13

## 2019-01-01 RX ADMIN — SODIUM CHLORIDE 500 MG: 900 INJECTION, SOLUTION INTRAVENOUS at 09:12

## 2019-01-01 RX ADMIN — Medication 10 ML: at 21:25

## 2019-01-01 RX ADMIN — SODIUM CHLORIDE, PRESERVATIVE FREE 900 UNITS: 5 INJECTION INTRAVENOUS at 04:20

## 2019-01-01 RX ADMIN — SODIUM CHLORIDE, PRESERVATIVE FREE 900 UNITS: 5 INJECTION INTRAVENOUS at 21:39

## 2019-01-01 RX ADMIN — DEXAMETHASONE SODIUM PHOSPHATE 10 MG: 10 INJECTION INTRAMUSCULAR; INTRAVENOUS at 14:37

## 2019-06-08 PROBLEM — G80.9 CEREBRAL PALSY (HCC): Chronic | Status: ACTIVE | Noted: 2019-01-01

## 2019-06-08 PROBLEM — I46.9 CARDIAC ARREST (HCC): Status: ACTIVE | Noted: 2019-01-01

## 2019-06-08 PROBLEM — J96.00 ACUTE RESPIRATORY FAILURE (HCC): Status: ACTIVE | Noted: 2019-01-01

## 2019-06-08 PROBLEM — G93.40 ACUTE ENCEPHALOPATHY: Status: ACTIVE | Noted: 2019-01-01

## 2019-06-08 PROBLEM — J93.9 PNEUMOTHORAX ON RIGHT: Status: ACTIVE | Noted: 2019-01-01

## 2019-06-08 PROBLEM — I95.9 HYPOTENSION: Status: ACTIVE | Noted: 2019-01-01

## 2019-06-08 NOTE — PROGRESS NOTES
Responded to code blue Staff was providing care for patient Went to ER to be with  Stevenson Ahr Provided updates on wife's condition until staff could speak with spouse Patient is being admitted to critical unit  had to go home to take care of disabled child Provided phone numbers to receiving unit Thomes Boast, staff Manish tan 63, [address]  /   [email or web address]

## 2019-06-08 NOTE — PROGRESS NOTES
Spoke with Dr. Irma Buitrago concerning MRI. Pt has pacemaker that has to be placed on MRI mode to be safe. Pt is also on levophed with very frequent PVCs and altering HR. Is critical at this time and voice my concern about pt not be stable enough to travel. MD agrees and testing will be held at this time until pt stabilizes further. Spoke with MRI tech who will call [facility] to schedule Pacemaker mode change on Monday for MRI.

## 2019-06-08 NOTE — PROGRESS NOTES
76 y/o with history of cerebral palsy present with increasing lethargy leading to coma. Head CT done revealed two areas of increased density in the right hemisphere. Would question whether this represents lymphoma. Needs enhanced MRI. [facility]

## 2019-06-08 NOTE — PROGRESS NOTES
TRANSFER - IN REPORT: 
 
Verbal report received from Tone Osman (name) on Kristine Davenport  being received from ED (unit) for change in patient condition(code blue decline) Report consisted of patients Situation, Background, Assessment and  
Recommendations(SBAR). Information from the following report(s) SBAR, Kardex, Intake/Output, MAR and Alarm Parameters  was reviewed with the receiving nurse. Opportunity for questions and clarification was provided. Assessment completed upon patients arrival to unit and care assumed.

## 2019-06-08 NOTE — PROGRESS NOTES
Ventilator check complete; patient has a #7. 0 ET tube secured at the 24 at the lip. Patient is not able to follow commands. Breath sounds are diminished. Trachea is midline, Negative for subcutaneous air, and chest excursion is symmetric. Patient is also Negative for cyanosis and is Negative for pitting edema. All alarms are set and audible. Resuscitation bag is at the head of the bed. Ventilator Settings Mode FIO2 Rate Tidal Volume Pressure PEEP I:E Ratio PRVC  75 %    450 ml(patient found on Vt 450)     10 cm H20  1:2.7 Peak airway pressure: 19 cm H2O Minute ventilation: 9.7 l/min ABG: No results for input(s): PH, PCO2, PO2, HCO3 in the last 72 hours.  
 
 
[facility], RT

## 2019-06-08 NOTE — ED PROVIDER NOTES
75-year-old female patient with a history of cerebral palsy presents to the emergency department with reported change in mentation. EMS reports that  of patient noted change in patient's mental status approximately 2 hours prior to arrival.  Mental status change reported as deviated gaze and drooling. Patient  apparently was less responsive than normal.  EMS transported in route with stable vital signs however just prior to arrival noted significant change in patient's mentation. On arrival she is minimally oversaw responsive to painful stimuli with GCS of 3. Decision made to intubate on arrival. 
 
 
  
 
Past Medical History:  
Diagnosis Date  Ill-defined condition   
 muscular dystrophy  Ill-defined condition Atrial fibrilation Past Surgical History:  
Procedure Laterality Date  ABDOMEN SURGERY PROC UNLISTED    
 laparotomy  HX  SECTION    
 HX ORTHOPAEDIC    
 left ankle, neck  HX OTHER SURGICAL    
 parotid  surgury  NEUROLOGICAL PROCEDURE UNLISTED    
 meningioma No family history on file. Social History Socioeconomic History  Marital status:  Spouse name: Not on file  Number of children: Not on file  Years of education: Not on file  Highest education level: Not on file Occupational History  Not on file Social Needs  Financial resource strain: Not on file  Food insecurity:  
  Worry: Not on file Inability: Not on file  Transportation needs:  
  Medical: Not on file Non-medical: Not on file Tobacco Use  Smoking status: Not on file Substance and Sexual Activity  Alcohol use: No  
 Drug use: No  
 Sexual activity: Not on file Lifestyle  Physical activity:  
  Days per week: Not on file Minutes per session: Not on file  Stress: Not on file Relationships  Social connections:  
  Talks on phone: Not on file Gets together: Not on file Attends Hindu service: Not on file Active member of club or organization: Not on file Attends meetings of clubs or organizations: Not on file Relationship status: Not on file  Intimate partner violence:  
  Fear of current or ex partner: Not on file Emotionally abused: Not on file Physically abused: Not on file Forced sexual activity: Not on file Other Topics Concern  Not on file Social History Narrative  Not on file ALLERGIES: Morphine Review of Systems Unable to perform ROS: Mental status change Vitals:  
 06/08/19 1411 BP: (!) 146/92 Pulse: 67 Resp: 14 Temp: 96.7 °F (35.9 °C) SpO2: 97% Weight: 45.4 kg (100 lb) Height: 5' 3\" (1.6 m) Physical Exam  
Constitutional: She appears well-developed and well-nourished. No distress. disheveled appearing female patient, unresponsive on arrival, gurgling respirations. HENT:  
Head: Normocephalic and atraumatic. Right Ear: External ear normal.  
Left Ear: External ear normal.  
Nose: Nose normal.  
Eyes: Pupils are equal, round, and reactive to light. pupils equal but sluggish bilaterally Neck: Normal range of motion. Cardiovascular: Normal rate, regular rhythm, normal heart sounds and intact distal pulses. Exam reveals no gallop and no friction rub. No murmur heard. Pulmonary/Chest: No stridor. Tachypnea noted. She is in respiratory distress. She has decreased breath sounds. She has no wheezes. She has rhonchi. She has no rales. She exhibits no tenderness. Course, rhonchorous sounding breath sounds bilaterally. On arrival.  
Abdominal: Soft. She exhibits no distension and no mass. There is no tenderness. There is no rebound and no guarding. No hernia. Musculoskeletal: Normal range of motion. She exhibits no edema, tenderness or deformity. Neurological: She is unresponsive. She displays atrophy.  A sensory deficit is present. No cranial nerve deficit. She exhibits abnormal muscle tone. GCS eye subscore is 1. GCS verbal subscore is 1. GCS motor subscore is 1. Skin: Skin is warm and dry. She is not diaphoretic. Psychiatric:  
unresponsive Nursing note and vitals reviewed. MDM Number of Diagnoses or Management Options Altered mental status, unspecified altered mental status type: new and requires workup Brain mass: new and requires workup Cardiac arrest [facility]): new and requires workup Pneumomediastinum [facility]): new and requires workup Pneumothorax on right: new and requires workup Diagnosis management comments: EMS originally called out for stroke, change in mental status, describes rightward gaze, drooling, decreased responsiveness. [facility], noted sudden decrease in patient's mentation just outside of hospital and elects today. On arrival, patient is minimally responsive to painful stimuli with gurgling respirations  Concerned that patient may not be able to protect her airway. Orders placed to intubate patient. Code stroke called on patient arrival, no indication for neurologic evaluation at this time While attempting to obtain CT imaging, CODE BLUE called for bradycardia followed by asystole. CPR initiated immediately. Respiratory therapist reports difficulty bagging patient initially however now has good color change on CO2 detector, visible secretions and the 2 that were suctioned easily. Bagging became much more easy per respiratory therapist, continued to display bilateral breath sounds with good color change. Patient required one dose of epinephrine in CT scan with return of spontaneous circulation.  
Spoke with radiology concerning patient CT imaging, consistent with subacute process bleeding versus mass  I discussed patient's images with on-call neurosurgeon who suggested MRI imaging with and without contrast when patient is stable enough. No indication for surgical intervention at this time. Recommend against steroids. Recommends supportive therapy at this time. eKG obtained after arrest, shows sinus tachycardia with rate 116. No evidence of acute ischemic change. Repeat x-ray imaging shows small pneumothorax without evidence of tension pathology. Discussed patient case with the on-call intensivist who agrees to evaluate patient this time. Spoke with patient family to update of patient's progress and condition. Answer questions and spoke with patient's daughter to inform the family about patient's progress at this time. 3:29 PM 
 
Blood pressure stabilized with fluids, spoke with radiologist, confirms small pneumothorax, no evidence of tension pathology,pneumomediastinum present as well. 3:35 PM 
Intensivist in the department to evaluate patient. 3:40 PM 
 
  
Amount and/or Complexity of Data Reviewed Clinical lab tests: ordered and reviewed Tests in the radiology section of CPT®: ordered and reviewed Tests in the medicine section of CPT®: ordered and reviewed Discussion of test results with the performing providers: yes Obtain history from someone other than the patient: yes Review and summarize past medical records: yes Discuss the patient with other providers: yes Independent visualization of images, tracings, or specimens: yes Risk of Complications, Morbidity, and/or Mortality Presenting problems: high Diagnostic procedures: high Management options: high General comments: CCT of 90 minutes for management of acute mental status change,cardiac arrest and respiratory failure, expert consultation. Critical Care Total time providing critical care:  minutes Patient Progress Patient progress: stable Intubation Date/Time: 6/8/2019 3:54 PM 
Performed by: Audrey Pierce DO Authorized by: Audrey Pierce DO Consent:  
  Consent obtained:  Emergent situation Consent given by:  Spouse Alternatives discussed:  No treatment Pre-procedure details:  
  Patient status:  Unresponsive Procedure details:  
  Preoxygenation:  Nonrebreather mask CPR in progress: no Intubation method:  Oral 
  Laryngoscope blade: Mac 4 Tube size (mm):  7.0 Tube type:  Cuffed Number of attempts:  2 Ventilation between attempts: yes Cricoid pressure: yes Tube visualized through cords: yes Placement assessment: ETT to lip:  24 Tube secured with:  ETT cobian Breath sounds:  Reduced on left Placement verification: direct visualization CXR findings:  ETT in proper place Post-procedure details:  
  Patient tolerance of procedure: Tolerated well, no immediate complications Comments:  
   initial intubation successful low chest rise and breath sounds were unequal. Tube withdrawn with minimal improvement in breath sounds. Tube was then withdrawn and intubation repeated with easy visualization of vocal cords and passage through the cords. Sounds unequal but present bilaterally. No sounds over the stomach on exam.  X-ray imaging shows tube in correct position, stable saturations following the procedure.

## 2019-06-08 NOTE — ED TRIAGE NOTES
Pt arrives via EMS from family stroke symptoms. Per EMS report, pt was alert and conversing on their arrival. Pt arrives on nonrebreather. Unresponsive at this time.  Dr Alaina Garcia to bedside for eval

## 2019-06-08 NOTE — PROGRESS NOTES
Code Blue called for patient in [address] [facility]. CPR in progress by ER RN. Pt intubated prior to arrival in ED.

## 2019-06-08 NOTE — PROGRESS NOTES
Patient was intubated with a number 7.0 ET Tube. Tube placement verified by CXR. ET Tube is secured at the 25 cm amy at the lip and on the bilateral side. Patient was intubated by Dr. Madelyn Reyna on the 5 attempt. Breath sounds are coarse. Patient is Negative for subcutaneous air and chest excursion is asymmetric  - greater on the right. Trachea is midline. Patient is also Positive for cyanosis and is Negative for pitting edema. Patient placed on ventilator on documented settings. All alarms are set and audible. Resuscitation bag is at the head of the bed. Ventilator Settings Mode FIO2 Rate Tidal Volume Pressure PEEP I:E Ratio PRVC  100 % 20 470 ml     12 cm H20  1:2.7 Peak airway pressure: 19 cm H2O Minute ventilation: 7.3 l/min

## 2019-06-08 NOTE — ED NOTES
Franchesca Rossi RN to ER bedside for report. TRANSFER - OUT REPORT: 
 
Verbal report given to Joselito(name) on [facility]  being transferred to [facility](unit) for routine progression of care Report consisted of patients Situation, Background, Assessment and  
Recommendations(SBAR). Information from the following report(s) ED Summary was reviewed with the receiving nurse. Lines:  
Peripheral IV Right Antecubital (Active) Peripheral IV Left Antecubital (Active) Opportunity for questions and clarification was provided. Patient transported with: 
 Registered Nurse

## 2019-06-08 NOTE — PROGRESS NOTES
Primary Nurse Ammon Holstein [place], RN performed a dual skin assessment on this patient Impairment noted- see wound doc flow sheet Juan score is 14 Red and non-blanchable sacrum/coccyx. Allyven placed.

## 2019-06-08 NOTE — H&P
HISTORY AND PHYSICAL Leonie Garcia 2019 Date of Admission:  2019 The patient's chart is reviewed and the patient is discussed with the staff. Subjective:  
 
Patient is a 77 y.o.  female presents with Altered mental status, unspecified altered mental status type: new and requires workup Diagnosis management comments: EMS originally called out for stroke, change in mental status, describes rightward gaze, drooling, decreased responsiveness. Transported to Hospitalfor suspected stroke, noted sudden decrease in patient's mentation just outside of hospital and elects today. On arrival, patient is minimally responsive to painful stimuli with gurgling respirations  Concerned that patient may not be able to protect her airway. She was intubated in the ER and sent for ct of head but upon arrival she developed bradycardia the asystole. CPR was initiated and only 1 epi was given with compressions for a a couple of minutes. Review of Systems Review of systems not obtained due to patient factors. Patient Active Problem List  
Diagnosis Code  Acute respiratory failure (HCC) J96.00  Cerebral palsy ([facility] G80.9  Acute encephalopathy G93.40  Cardiac arrest ([facility] I46.9  Hypotension I95.9 Prior to Admission Medications Prescriptions Last Dose Informant Patient Reported? Taking?  
metoprolol tartrate (LOPRESSOR) 25 mg tablet   Yes No  
Sig: Take 12.5 mg by mouth two (2) times a day. polyethylene glycol (MIRALAX) 17 gram packet   Yes No  
Sig: Take 17 g by mouth daily. Facility-Administered Medications: None Past Medical History:  
Diagnosis Date  Cerebral palsy ([facility] 2019  Ill-defined condition   
 muscular dystrophy  Ill-defined condition Atrial fibrilation Past Surgical History:  
Procedure Laterality Date  ABDOMEN SURGERY PROC UNLISTED    
 laparotomy  HX  SECTION    
  HX ORTHOPAEDIC    
 left ankle, neck  HX OTHER SURGICAL    
 parotid  surgury  NEUROLOGICAL PROCEDURE UNLISTED    
 meningioma Social History Socioeconomic History  Marital status:  Spouse name: Not on file  Number of children: Not on file  Years of education: Not on file  Highest education level: Not on file Occupational History  Not on file Social Needs  Financial resource strain: Not on file  Food insecurity:  
  Worry: Not on file Inability: Not on file  Transportation needs:  
  Medical: Not on file Non-medical: Not on file Tobacco Use  Smoking status: Not on file Substance and Sexual Activity  Alcohol use: No  
 Drug use: No  
 Sexual activity: Not on file Lifestyle  Physical activity:  
  Days per week: Not on file Minutes per session: Not on file  Stress: Not on file Relationships  Social connections:  
  Talks on phone: Not on file Gets together: Not on file Attends [employer] service: Not on file Active member of club or organization: Not on file Attends meetings of clubs or organizations: Not on file Relationship status: Not on file  Intimate partner violence:  
  Fear of current or ex partner: Not on file Emotionally abused: Not on file Physically abused: Not on file Forced sexual activity: Not on file Other Topics Concern  Not on file Social History Narrative  Not on file No family history on file. Allergies Allergen Reactions  Morphine Other (comments) \"cardiac arrest\" Current Facility-Administered Medications Medication Dose Route Frequency  propofol (DIPRIVAN) infusion  5 mcg/kg/min IntraVENous TITRATE  sodium chloride 0.9 % bolus infusion 1,000 mL  1,000 mL IntraVENous ONCE Current Outpatient Medications Medication Sig  
 metoprolol tartrate (LOPRESSOR) 25 mg tablet Take 12.5 mg by mouth two (2) times a day.  polyethylene glycol (MIRALAX) 17 gram packet Take 17 g by mouth daily. Objective:  
 
Vitals:  
 06/08/19 1528 06/08/19 1531 06/08/19 1534 06/08/19 1546 BP: (!) 89/57 (!) 89/57 128/63 (!) 87/55 Pulse: (!) 145 (!) 139 (!) 135 (!) 130 Resp: 20 20 20 20 Temp:      
SpO2: 98% 100% 100% 96% Weight:      
Height: PHYSICAL EXAM  
 
Constitutional:  the patient is well developed and in no acute distress EENMT:  Sclera clear, pupils equal, oral mucosa moist 
Respiratory: clear Cardiovascular:  RRR without M,G,R 
Gastrointestinal: soft and non-tender; with positive bowel sounds. Musculoskeletal: warm without cyanosis. There is no lower extremity edema. Skin:  no jaundice or rashes, no wounds Neurologic: no gross neuro deficits Psychiatric:  unresponsive CXR: 
 
 
Recent Labs  
  06/08/19 
1418 06/08/19 26 WBC  --  8.8 HGB  --  14.2 HCT  --  44.6 PLT  --  347 INR 0.9  --   
 
Recent Labs  
  06/08/19 26   
K 3.9  * CO2 29 BUN 15  
CREA 0.27* CA 9.4 ALB 3.2 TBILI 0.6 ALT 15 SGOT 18  
head ct   abnrmal attenuation process R posterior frontal lobe, R temporal lobe and R occipital lobe No results for input(s): PH, PCO2, PO2, HCO3, PHI, PCO2I, PO2I, HCO3I in the last 72 hours. No results for input(s): LCAD, LAC in the last 72 hours. Assessment:  (Medical Decision Making) Hospital Problems  Never Reviewed Codes Class Noted POA Acute respiratory failure ([facility] ICD-10-CM: J96.00 
ICD-9-CM: 518.81  6/8/2019 Unknown Cerebral palsy (HCC) (Chronic) ICD-10-CM: G80.9 ICD-9-CM: 343.9  6/8/2019 Yes Acute encephalopathy ICD-10-CM: G93.40 ICD-9-CM: 348.30  6/8/2019 Unknown Cardiac arrest [facility]) ICD-10-CM: I46.9 ICD-9-CM: 427.5  6/8/2019 Unknown  
 cpr Hypotension ICD-10-CM: I95.9 ICD-9-CM: 458.9  6/8/2019 Unknown Fluid bolus Plan:  (Medical Decision Making) --Will admit for further medical management to icu Neurosurgery called-Dr. Catrachito Mccurdy- he recommends mri and supportive care, steroids , anticoagulants not recommended- mri ordered Vent support 
bp borderline- will give iv fluids Neurology consult at some point -- 
Critical care time -48 minutes More than 50% of the time documented was spent in face-to-face contact with the patient and in the care of the patient on the floor/unit where the patient is located.  
 
Rosa Silva MD

## 2019-06-09 NOTE — PROGRESS NOTES
Ventilator check complete; patient has a #7. 0 ET tube secured at the 23 at the lip. Patient is sedated. Patient is not able to follow commands. Breath sounds are diminished. Trachea is midline, Negative for subcutaneous air, and chest excursion is symmetric. Patient is also Negative for cyanosis and is Positive for pitting edema. All alarms are set and audible. Resuscitation bag is at the head of the bed. Ventilator Settings Mode FIO2 Rate Tidal Volume Pressure PEEP I:E Ratio PRVC  50 %   18 450 ml     10 cm H20  1:2.7 Peak airway pressure: 21 cm H2O Minute ventilation: 13 l/min ABG: No results for input(s): PH, PCO2, PO2, HCO3 in the last 72 hours.  
 
 
Taurus Chen, RT

## 2019-06-09 NOTE — PROGRESS NOTES
Bedside report received from Patrick Magallanes [facility].

## 2019-06-09 NOTE — PROGRESS NOTES
Spoke with CHRISTIANO Can regarding MRI for tomorrow. [facility] representative will be here by 930, patient needs to be in MRI at 9 am. Monday morning MRI staff aware.

## 2019-06-09 NOTE — CONSULTS
Consult Patient: Fercho Michaels MRN: [id number] YOB: 1950  Age: 77 y.o. Sex: female I have seen and examined the patient along with Mook Rondon NP. I agree with the documentation as recorded. In addition I would add: 
 
I have seen the patient independently during which time I reviewed the history and performed a physical examination, reviewed the NP documentation and discussed with the NP . The history obtained is notable for: Long history of myotonic dystrophy. Patient presents with a staring spell. CT of head demonstrates a suggestion of a space-occupying mass in the right hemisphere. The physical examination performed is notable for: Patient intubated. Alert and follows commands. CT reviewed  With family. MRI pending The medical decision making including assessment and recommendations is notable for:  Probable rright hemisphere mass with  Focal seizure with impairment of consciousness. Pending MRI but will need programming support for pacemaker. Begin Keppra 500 mg IV every 12 hours. EEG Alix Michelle MD 
 
 
Subjective:  
  
Fercho Michaels is a 77 y.o. female who is being seen for abnormal CT. 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 Patient is currently intubated. History obtained from spouse and daughter at bedside. Approximately 12 PM, the patient started to stare off into space and became confused, rightward gaze and drooling. Spouse stated there were no rhythmic jerking or reports of HA prior to the event. Denies history of seizures or previous similar events, recent illness, or trauma. He called EMS, and she was transported ED where she became less responsive. Patient had a GCS 3 on arrival to the ED. Code S was called. Tele neuro was not consulted. She was noted to be gurgling, and was intubated in ED for airway protection.  She was sent for CT of head but upon arrival she developed bradycardia the asystole. Code Blue was initiated. Spontaneous HR returned after 2 rounds of CPR and one dose of epinephrine administered. CT of head abnormal attenuation changes in the right posterior frontal lobe, posterior temporal lobe, and right occipital lobe,  An area of somewhat defined abnormal density is seen in the right occipital lobe which could either represent subacute hematoma potential mass. Adjacent edema is seen. Neurosurgery consulted in ED, no surgical intervention indicated and recommended MRI of brain. Past Medical History:  
Diagnosis Date  Cerebral palsy ([address] 2019  Ill-defined condition   
 muscular dystrophy  Ill-defined condition Atrial fibrilation Past Surgical History:  
Procedure Laterality Date  ABDOMEN SURGERY PROC UNLISTED    
 laparotomy  HX  SECTION    
 HX ORTHOPAEDIC    
 left ankle, neck  HX OTHER SURGICAL    
 parotid  surgury  NEUROLOGICAL PROCEDURE UNLISTED    
 meningioma No family history on file. Social History Tobacco Use  Smoking status: Not on file Substance Use Topics  Alcohol use: No  
  
Current Facility-Administered Medications Medication Dose Route Frequency Provider Last Rate Last Dose  NUTRITIONAL SUPPORT ELECTROLYTE PRN ORDERS   Does Not Apply PRN Merlin Patterson MD      
 sodium chloride (NS) flush 30 mL  30 mL InterCATHeter Q8H Niki Ruiz MD      
 heparin (porcine) pf 900 Units  900 Units InterCATHeter Q8H Niki Ruiz MD      
 sodium chloride (NS) flush 30 mL  30 mL InterCATHeter PRN Niki Ruiz MD      
 heparin (porcine) pf 900 Units  900 Units InterCATHeter PRN Niki Ruiz MD      
 sodium chloride (NS) flush 5-40 mL  5-40 mL IntraVENous Q8H Sinai BRYANT NP   10 mL at 19 0527  
 sodium chloride (NS) flush 5-40 mL  5-40 mL IntraVENous PRN Maria Dolores Castle NP      
  lactated Ringers infusion  100 mL/hr IntraVENous CONTINUOUS Madalyn Carmen D,  mL/hr at 06/09/19 1247 100 mL/hr at 06/09/19 1247  ondansetron (ZOFRAN) injection 4 mg  4 mg IntraVENous Q4H PRN Jayant Neither, NP      
 NOREPINephrine (LEVOPHED) 4 mg/250 mL (16 mcg/mL) in NS infusion  2-16 mcg/min IntraVENous TITRATE Genesis Moya MD 15 mL/hr at 06/09/19 1044 4 mcg/min at 06/09/19 1044  
 fentaNYL citrate (PF) injection 25 mcg  25 mcg IntraVENous Q4H PRN Merlin Patterson MD   25 mcg at 06/09/19 0024 Allergies Allergen Reactions  Morphine Other (comments) \"cardiac arrest\" Review of Systems: 
Unable to assess due to AMS. Objective:  
 
Vitals:  
 06/09/19 1545 06/09/19 1601 06/09/19 1604 06/09/19 1616 BP: 112/71 112/71  105/70 Pulse: 68 69 70 71 Resp: 18 18 18 18 Temp:      
SpO2: 100% 100% 100% 100% Weight:      
Height:      
  
 
Physical Exam: 
General - chronically ill, well nourished, in no apparent distress. Pleasant and conversent. HEENT - Normocephalic, atraumatic. Conjunctiva, tympanic membranes, and oropharynx are clear. Neck - Supple without masses, no bruits Cardiovascular - V paced on telemetry. PPM to left chest. Regular rate and rhythm. Normal S1, S2 without murmurs, rubs, or gallops. Lungs - Clear to auscultation. Chest tube to right chest.  
Abdomen - Soft, nontender with normal bowel sounds. Extremities - Peripheral pulses intact. No edema and no rashes. Neurological examination Level of consciousness- intubated Brain stem reflexes 
-Pupillary reflex to bright light: 3mm PERRL, brisk 
-Ciliospinal reflexes: present 
-Oculovestibular and/or oculocephalic reflex response: na 
-Corneal reflex: present 
-Facial movement to painful stimulus at supraorbital nerve, temporomandibular joint: present 
-Cough/gag reflex: present Motor examination 
-Muscle tone: atrophy in all extremities, flaccid in LUE, BLE, moves RUE spontaneously 
-Motor response to pain: withdraws from nailbed pressure in all extremities -Muscle stretch reflexes: +1 BUE, absent BLE 
-Response to plantar stimulation: flexor CT Results (most recent): 
Results from Hospital Encounter encounter on 06/08/19 CT HEAD WO CONT Narrative CT HEAD WITHOUT CONTRAST, 6/8/2019 History: Initial leftward gaze, and drooling. CODE BLUE. Comparison: CT head without contrast 9/16/2018 Technique:   5 mm axial scans from the skull base to the vertex. All CT scans 
performed at this facility use one or all of the following: Automated exposure 
control, adjustment of the mA and/or kVp according to patient's size, iterative 
reconstruction. Findings:  Although no clear acute dense hemorrhage is seen, there is abnormal 
attenuation seen in the right posterior frontal lobe, right posterior temporal 
lobe, and right occipital lobe which is incompletely characterized on this 
noncontrast study. There is an area of central density in the right occipital 
lobe seen on image 15 measuring 1.9 cm a 1.9 cm with adjacent edema which could 
either represent sequela of a subacute hemorrhage or mass at this level. Additional vague density is seen in the right posterior temporal region on image 20 which does appear to represent hemorrhage although is felt to be more 
subacute in its appearance. No abnormal extra-axial fluid collections are seen. .  No evidence of midline shift or herniation is seen. No abnormal edema 
pattern is seen in a vascular distribution to suggest large artery infarction. Evaluation with bone windows shows no acute osseous changes. The visualized 
sinuses, middle ears, and mastoid air cells are well aerated. Impression IMPRESSION:   
1. No acute intracranial process evident by noncontrast CT study of the head. these are similar in size to the prior study and no abnormal sulcal effacement is se in therefore this is felt to represent chronic extraocular dilation. No 
evidence for herniation is seen. No abnormal edema pattern is seen in a 
vascular distribution to suggest large artery infarction. Stable CSF attenuation 
abnormality is seen at the left frontal lobe  which may be postsurgical given 
the  stable post surgical changes of the overlying calvarium . L Evaluation with bone windows shows no acute osseous changesmoderate fluid is 
seen in left mastoid air cells. IMPRESSION:   
1. Abnormal attenuation changes in the right posterior frontal lobe, posterior 
temporal lobe, and right occipital lobe suboptimally characterized on this 
noncontrast study. An area of somewhat defined abnormal density is seen in the 
right occipital lobe which could either represent subacute hematoma  potential 
mass. Adjacent edema is seen. Additional vague density is seen in the right 
posterior frontal lobe and posterior temporal lobe whose appearance does suggest 
blood products  although is felt to have a more subacute appearance. No 
evidence for acute hydrocephalus, or herniation is seen. A contrasted MRI of the 
brain may help better characterize  these changes. These critical findings were discussed with  Dr. Yves moura personally 
at 3:05 p.m. on 6/8/2019. Results for orders placed or performed during the hospital encounter of 06/08/19 EKG, 12 LEAD, INITIAL Result Value Ref Range Ventricular Rate 70 BPM  
 Atrial Rate 159 BPM  
 QRS Duration 188 ms Q-T Interval 448 ms QTC Calculation (Bezet) 483 ms Calculated R Axis -125 degrees Calculated T Axis 51 degrees Diagnosis    
  ventricular  paced rhythm Confirmed by Papito Diana ([id number]) on 6/9/2019 7:41:26 AM 
  
  
 
MRI Results (most recent): No results found for this or any previous visit. Most recent MRA: 
No results found for this or any previous visit. Most recent Echo: No results found for this visit on 06/08/19. Assessment:  
 
78 y/o female presenting with AMS, right gaze deviation, s/p cardiac arrest. 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 Code S. No initial NIH or tele neuro consult. CT of head abnormal attenuation changes in the right posterior frontal lobe, posterior temporal lobe, and right occipital lobe,  An area of somewhat defined abnormal density is seen in the right occipital lobe which could either represent subacute hematoma potential mass. Adjacent edema is seen. Neurosurgery consulted in ED, no surgical intervention indicated. Differentials include neoplasm v. cva v. hematoma. MRI of brain pending. Tension Pneumothorax, right- chest tube Myotonic dystrophy Hypotension- on levophed Plan: MRI of brain pending. Continue supportive measures. Signed By: Bimal Lewis NP   
 June 9, 2019

## 2019-06-09 NOTE — PROGRESS NOTES
Bedside and Verbal shift change report given to Micah Persaud RN (oncoming nurse) by Sylvia España RN (offgoing nurse). Report included the following information SBAR, Kardex, ED Summary, Procedure Summary, Intake/Output, MAR, Recent Results and Cardiac Rhythm NSR. Dual skin assessment reviewed. Pt lying calmly in bed, pt is intubated and not on any sedation. Does not follow commands. Moves right arm spontaneously. Withdraws to nailbed pressure in all extremities. Pupils 3mm, round and brisk. Does not track or follow. NSR w/1st deg AVB and occas PACs, HR 80. BP 90s/60s, MAP >65 on levo gtt at 4 mcg/min. On PRVC on vent. Right lat chest tube in place, to continuous sxn, +air leak noted. Bryant in place with marginal UOP. Pt does not appear to be in any distress or pain at this time.

## 2019-06-09 NOTE — PROGRESS NOTES
1734 - Monitor alarming. Noted pt HR 160s, appears to be in SVT rhythm. Attempt to suction pt via ETT to try and break rhythm. Unsuccessful. 1737 - Pacemaker capturing and pt rhythm slowing back down. Now back in NSR w/1st deg AVB and occas PVCs, also occas paced beats, HR 79. /80 on levo gtt at 4 mcg/min. Paged and notified Dr. Steve Leonardo. Discussed pt status and plan of care. Will change levo gtt to sejal gtt and monitor. STAT BMP/Mag sent.

## 2019-06-09 NOTE — PROGRESS NOTES
PICC Placement Note PRE-PROCEDURE VERIFICATION Correct Procedure: yes. Time out completed with assistant Moon Duncan RN and all persons present in agreement with time out. Correct Site:  yes Temperature: Temp: 99.8 °F (37.7 °C), Temperature Source: Temp Source: Axillary Recent Labs  
  06/09/19 
0532 06/08/19 
1418 BUN 14  --   
CREA 0.32*  --   
  --   
INR  --  0.9 WBC 10.8  -- Allergies: Morphine Education materials for [employer] [facility] given to patient or family. PROCEDURE DETAIL A triple lumen PICC line was started for vascular access and desire for reliable access. The following documentation is in addition to the PICC properties in the lines/airways flowsheet : 
Lot #: [id number] 
xylocaine used: yes Mid-Arm Circumference: 22 (cm) Internal Catheter Length: 37 (cm) Internal Catheter Total Length: 37 (cm) Vein Selection for PICC:right brachial 
Central Line Bundle followed yes Complication Related to Insertion: none Both the insertion guidewire and ECG guidewire were removed intact all ports have positive blood return and were flush well with normal saline. The location of the tip of the PICC is verified using ECG technology. The tip is in the SVC per ECG reading. See image below. Line is okay to use: yes

## 2019-06-09 NOTE — PROCEDURES
PROCEDURE: 
24 F chest tube placement INDICATION: 
PTX on the R Summary: 
 
The R chest was prepped and draped in the usual fashion with chlorhexidine. The 5th and 6th IC space in the mid axillary line was identified and marked with needle cap. The skin, subcutaneous tissue and rib along with the IC muscles were also anesthetised with a total of 20 cc of 1% lidocaine. Sterile technique was utilized during the entire procedure. An incision was then made in the skin followed by blunt dissection to the IC space previously marked as noted above. A curved hemostat was then used to penetrate the chest with a gush of air present upon entry into the chest indicating tension. Following this a 24F chest tube was inserted into the chest with the aid of provided plastic introducer without complication. 1.0 silk suture was used to close the lateral edge of the wound and affix the chest tube in place. An atrium was then attached and the entire apparatus placed to -20 cm H2O of suction. Dressing was applied and chest tube drainage affixed to the chest with silk tape. There were no complications. EBL: 1 ml Tayo Mckeon MD.

## 2019-06-09 NOTE — PROGRESS NOTES
Critical Care Daily Progress Note: 6/9/2019 Admission Date: 6/8/2019 The patient's chart is reviewed and the patient is discussed with the staff. 
 
 
77 y.o.  female presents with Altered mental status, unspecified altered mental status type: new and requires workup Diagnosis management comments: EMS originally called out for stroke, change in mental status, describes rightward gaze, drooling, decreased responsiveness.  Transported to Hospitalfor suspected stroke, noted sudden decrease in patient's mentation just outside of hospital and elects today.  On arrival, patient is minimally responsive to painful stimuli with gurgling respirations  Concerned that patient may not be able to protect her airway. She was intubated in the ER and sent for ct of head but upon arrival she developed bradycardia the asystole. CPR was initiated and only 1 epi was given with compressions for a a couple of minutes Subjective:  
ptx yesterday- chest tube placed by Dr. Karlie Seaman,  Now on 4 mics levophed,  Remains unresponsive Current Facility-Administered Medications Medication Dose Route Frequency  NUTRITIONAL SUPPORT ELECTROLYTE PRN ORDERS   Does Not Apply PRN  potassium chloride 20 mEq in 100 ml IVPB  20 mEq IntraVENous Q2H  
 propofol (DIPRIVAN) infusion  5 mcg/kg/min IntraVENous TITRATE  sodium chloride (NS) flush 5-40 mL  5-40 mL IntraVENous Q8H  
 sodium chloride (NS) flush 5-40 mL  5-40 mL IntraVENous PRN  
 lactated Ringers infusion  100 mL/hr IntraVENous CONTINUOUS  
 ondansetron (ZOFRAN) injection 4 mg  4 mg IntraVENous Q4H PRN  
 NOREPINephrine (LEVOPHED) 4 mg/250 mL (16 mcg/mL) in NS infusion  2-16 mcg/min IntraVENous TITRATE  fentaNYL citrate (PF) injection 25 mcg  25 mcg IntraVENous Q4H PRN Review of Systems Unobtainable due to patient status. Objective:  
 
Vitals:  
 06/09/19 1611 06/09/19 5776 06/09/19 0715 06/09/19 2930 BP: 106/53 (!) 86/60 97/66 Pulse: 71 78 65 81 Resp: 18 16 13 18 Temp:      
SpO2: 100% 100% 100% 100% Weight:      
Height:      
 
 
Intake and Output:  
06/07 1901 - 06/09 0700 In: 9456 [I.V.:1472] Out: 405 [Urine:400] No intake/output data recorded. Physical Exam:         
Constitutional:  intubated and mechanically ventilated. EENMT:  Sclera clear, pupils equal, oral mucosa moist 
Respiratory: clear Cardiovascular:  RRR with no M,G,R; 
Gastrointestinal:  soft with no tenderness; positive bowel sounds present Musculoskeletal:  warm with no cyanosis, no lower extremity edema Skin:  no jaundice or ecchymosis Neurologic: no gross neuro deficits Psychiatric:  unresponsive LINES:   
ETT 
 
DRIPS:   
levophed CXR:   
 
 
Ventilator Settings Mode FIO2 Rate Tidal Volume Pressure PEEP PRVC  50 %    450 ml     10 cm H20 Peak airway pressure: 21 cm H2O Minute ventilation: 13 l/min ABG:  
Recent Labs  
  06/09/19 
0406 06/08/19 
1555 PHI 7.436 7.407 PCO2I 21.5* 28.1*  
PO2I 330* 403* HCO3I 14.5* 17.7* LAB Recent Labs  
  06/08/19 [id number] GLUCPOC 122* Recent Labs  
  06/09/19 
0532 06/08/19 
1418 06/08/19 26 WBC 10.8  --  8.8 HGB 12.7  --  14.2 HCT 39.2  --  44.6   --  347 INR  --  0.9  --   
 
Recent Labs  
  06/09/19 
0532 06/08/19 26  139  
K 3.2* 3.9 * 105 CO2 18* 29 * 126* BUN 14 15 CREA 0.32* 0.27* MG 2.0  --   
CA 8.7 9.4 ALB  --  3.2 SGOT  --  18 No results for input(s): LCAD, LAC in the last 72 hours. Assessment:  (Medical Decision Making) Hospital Problems  Never Reviewed Codes Class Noted POA Acute respiratory failure ([facility] [address] ICD-10-CM: J96.00 
ICD-9-CM: 518.81  6/8/2019 Unknown On vent support Cerebral palsy (HCC) (Chronic) ICD-10-CM: G80.9 ICD-9-CM: 343.9  6/8/2019 Yes Acute encephalopathy ICD-10-CM: G93.40 ICD-9-CM: 348.30  6/8/2019 Unknown Not improved Cardiac arrest [facility]) ICD-10-CM: I46.9 ICD-9-CM: 427.5  6/8/2019 Unknown Hypotension ICD-10-CM: I95.9 ICD-9-CM: 458.9  6/8/2019 Unknown Pneumothorax on right ICD-10-CM: J93.9 ICD-9-CM: 512.89  6/8/2019 Unknown Hypotension- ? Sepsis vs cardiogenic Critically ill Plan:  (Medical Decision Making) 1   Needs central line or pic 2   Vent support 
3   Mri tomorrow-neurology following 
4   levophed -- 
Critical care time 42 minutes More than 50% of the time documented was spent in face-to-face contact with the patient and in the care of the patient on the floor/unit where the patient is located.  
 
Slava Vital MD

## 2019-06-10 NOTE — PROGRESS NOTES
Ventilator check complete; patient has a #7. 0 ET tube secured at the 24 at the lip. Patient is sedated. Breath sounds are coarse. Trachea is midline, Negative for subcutaneous air, and chest excursion is symmetric. Patient is also Negative for cyanosis and is Positive for pitting edema. All alarms are set and audible. Resuscitation bag is at the head of the bed. Ventilator Settings Mode FIO2 Rate Tidal Volume Pressure PEEP I:E Ratio PRVC  35 %    450 ml     10 cm H20  1:2.7 Peak airway pressure: 22 cm H2O Minute ventilation: 8.2 l/min ABG: No results for input(s): PH, PCO2, PO2, HCO3 in the last 72 hours.  
 
 
Demario De Luna, RT

## 2019-06-10 NOTE — PROGRESS NOTES
Pt seen in CCU s/p admission AMS, hx of MD, and currently intubated/vent. Spouse confirms demographics and is main caregiver at home. CM to follow for any assist and d/c POC when medically stable. Care Management Interventions PCP Verified by CM: Yes(Confirms PCP at [facility]) Mode of Transport at Discharge: Other (see comment) Transition of Care Consult (CM Consult): Discharge Planning Discharge Durable Medical Equipment: (w/c) Current Support Network: Lives with Spouse, Own Home(lives with spouse, Todd Thomason -[phone]- had daughter that is MD. ) Confirm Follow Up Transport: Family Plan discussed with Pt/Family/Caregiver: Yes Freedom of Choice Offered: Yes [employer] Information Provided?: (confirms PHILLIP/Hugo) Discharge Location Discharge Placement: Unable to determine at this time

## 2019-06-10 NOTE — PROCEDURES
EEG 
 
This electroencephalogram was performed on a multi channel digital EEG device utilizing dermal electrodes the study is performed in the intensive care unit. The International 10-20 electrode system is utilized in the recording. During wakefulness there is a normally reactive occipital alpha rhythm. The dominant feature in the electroencephalogram is more rhythmic 4 Hz activity which is seen predominantly in the left temporal area regions but this is of moderate amplitude. There is voltage suppression noted in a rather diffuse fashion together with slowing over the right frontal and temporal regions. in the latter stages of the study there is a buildup and there is generalized paroxysmal activity which occurs over a minute. There is normal reactivity noted of the electroencephalogram 
 
Impression Evidence of bilateral brain disease superimposed upon the however and intact occipital background indicating wakefulness there are superimposed ictal discharges as described

## 2019-06-10 NOTE — PROGRESS NOTES
Requested MRI screening form- pt scheduled for MRI at 9am with pacemaker rep- nurse was made aware yesterday of exam time and screening form

## 2019-06-10 NOTE — INTERDISCIPLINARY ROUNDS
Interdisciplinary team rounds were held 6/10/2019 with the following team members:Care Management, Nursing, Nurse Practitioner, Nutrition, Palliative Care, Pastoral Care, Pharmacy, Physical Therapy, Physician, Respiratory Therapy and Clinical Coordinator and the patient. Plan of care discussed. See clinical pathway and/or care plan for interventions and desired outcomes.

## 2019-06-10 NOTE — PROGRESS NOTES
Neurology Daily Progress Note Assessment:  
 
Right hemisphere mass- MRI demonstrated 2 cm isointense T2 lesion in 
the right parietal occipital lobe, slightly larger adjacent 3.3 x 2.6 cm lesion is seen slightly more superiorly along the the periphery of the posterior right parietal lobe surrounding vasogenic edema are demonstrated highly suspicious 
for adenocarcinoma, with resulting in mass effect and compression of the right 
ventricular trigone, third small peripherally enhancing lesion is demonstrated in the 
deep white matter of the right parietal lobe at the gray-white junction. Will start dexamethasone 10mg IV x 1 dose now, and then 4mg IV every 6 hours for cerebral edema. Recommend CT of chest/abdomen/pelvis to determine possible source. Would recommend Hematology/Oncology evaluation. Focal seizures with impairment of consciousness- EEG pending. Continue Keppra IV. Plan: EEG pending. Continue Keppra IV. Dexamethasone 10 mg IV x 1 dose now, and then 4mg IV every 6 hours. Recommend CT of chest/abdomen/pelvis. Recommend Hematology/Oncology consult. Continue supportive measures. Subjective: Interval history: 
 
Patient remains intubated. EEG in progress. Patient alert Continues to have right gaze deviation. Able to to follow command, patient can wiggle toes and squeeze hand. Moves RUE/RLE spontaneously, remains flaccid on left side. History: 
 
Aren Duncan is a 77 y.o. female who is being seen for abnormal CT. Review of systems negative with exception of pertinent positives and negatives noted above. Objective:  
 
Vitals:  
 06/10/19 1431 06/10/19 1446 06/10/19 1500 06/10/19 1515 BP: 119/70 114/82 113/69 103/61 Pulse: 84 81 75 72 Resp: 15 18 18 18 Temp:      
SpO2: 100% 100% 100% 100% Weight:      
Height:      
  
 
 
Current Facility-Administered Medications:  
  levETIRAcetam (KEPPRA) 500 mg in 0.9% sodium chloride 100 mL IVPB, 500 mg, IntraVENous, Q12H, McBurney, Rulon Boll, MD, Last Rate: 400 mL/hr at 06/10/19 0912, 500 mg at 06/10/19 3754   dexamethasone (DECADRON) 4 mg/mL injection 4 mg, 4 mg, IntraVENous, Q6H, Rice, Jolly Kehr, NP 
  NUTRITIONAL SUPPORT ELECTROLYTE PRN ORDERS, , Does Not Apply, PRN, Merlin Patterson MD 
  sodium chloride (NS) flush 30 mL, 30 mL, InterCATHeter, Q8H, Park Santana MD, 30 mL at 06/10/19 1328   heparin (porcine) pf 900 Units, 900 Units, InterCATHeter, Q8H, Park Santana MD, 900 Units at 06/10/19 1328   sodium chloride (NS) flush 30 mL, 30 mL, InterCATHeter, PRN, Park Santana MD 
  heparin (porcine) pf 900 Units, 900 Units, InterCATHeter, PRN, Park Santana MD 
  PHENYLephrine (PF)(RICK-SYNEPHRINE) 30 mg in 0.9% sodium chloride 250 mL infusion,  mcg/min, IntraVENous, TITRATE, Jayesh Brown MD, Stopped at 06/10/19 0630 
  lactated Ringers infusion, 100 mL/hr, IntraVENous, CONTINUOUS, Yuliya Floyd NP, Last Rate: 100 mL/hr at 06/10/19 1053, 100 mL/hr at 06/10/19 1053   ondansetron (ZOFRAN) injection 4 mg, 4 mg, IntraVENous, Q4H PRN, Jorge Floyd NP 
  NOREPINephrine (LEVOPHED) 4 mg/250 mL (16 mcg/mL) in NS infusion, 2-16 mcg/min, IntraVENous, TITRATE, Park Santana MD, Stopped at 06/1948 
  fentaNYL citrate (PF) injection 25 mcg, 25 mcg, IntraVENous, Q4H PRN, Merlin Patterson MD, 25 mcg at 06/09/19 0024 Recent Results (from the past 12 hour(s)) POC G3 Collection Time: 06/10/19  5:15 AM  
Result Value Ref Range Device: VENT    
 FIO2 (POC) 50 % pH (POC) 7.424 7.35 - 7.45    
 pCO2 (POC) 22.8 (L) 35 - 45 MMHG  
 pO2 (POC) 207 (H) 75 - 100 MMHG  
 HCO3 (POC) 14.9 (L) 22 - 26 MMOL/L  
 sO2 (POC) 100 (H) 95 - 98 % Base deficit (POC) 8 mmol/L Mode Pressure regulated volume control Tidal volume 450 ml Set Rate 18 bpm  
 PEEP/CPAP (POC) 10 cmH2O Allens test (POC) NOT APPLICABLE  Site LEFT BRACHIAL    
 Patient temp. 98.6 Specimen type (POC) ARTERIAL Performed by TumblinTamaraRT   
 CO2, POC 16 MMOL/L Respiratory comment: NurseNotified Exhaled minute volume 8.00 L/min 425 Power [place] MRI Results (most recent): 
Results from Hospital Encounter encounter on 06/08/19 MRI BRAIN W WO CONT Narrative Clinical History: The patient is a 77years year old Female presenting with 
symptoms of Neuro deficit, acute, single, progressing Comparison:  Head CT 6/8/2019 Technique:   T2-weighted, T1-weighted, FLAIR, and diffusion-weighted transaxial 
, T1 sagittal, and gradient echo coronal pulse sequences were initially 
performed. Following  the administration of contrast, additional T1-weighted 
transaxial and coronal sequences were performed. 9 mL of gadoterate meglumine (DOTAREM) 0.5 mmol/mL (376.9 mg/mL) contrast was administered intravenously. Findings:  
 
Age-related cortical involutional changes are seen. There are remote left 
posterior frontal parietal craniotomy changes with associated postoperative 
cavity in the posterior left frontal convexity demonstrating minimal surrounding 
gliosis. Postcontrast imaging is degraded by patient motion, however 
demonstrates an ill-defined peripherally enhancing 2 cm isointense T2 lesion in 
the right parietal occipital lobe highly suspicious for adenocarcinoma. A 
similar slightly larger adjacent 3.3 x 2.6 cm lesion is seen slightly more 
superiorly along the the periphery of the posterior right parietal lobe. There 
is associated surrounding vasogenic edema and mass effect compressing the right 
ventricular trigone however without temporal horn entrapment. A third small 
peripherally enhancing 5 mm lesion is demonstrated in the deeper right parietal 
white matter at the gray-white junction. There is diffuse mild dural enhancement 
which may be related to previous craniotomy. Slight increased T2 signal is suggested in both cerebral peduncles extending into the erick. There are no 
abnormal extra-axial fluid collections. There is no diffusion signal 
abnormality. Expected flow voids are maintained in the major intracranial 
vessels. The cerebellum and brainstem are otherwise unremarkable. There is no evidence of 
Chiari malformation. The ventricular system and CSF containing spaces are unremarkable in appearance. Visualized extracranial soft tissues are unremarkable. Mild left maxillary sinus mucosal inflammatory changes are seen. There is 
bilateral mastoid air cell fluid, left greater than right. Impression Impression: 1. At least 2 right parieto-occipital isointense T2, peripherally enhancing 
lesions with with surrounding vasogenic edema are demonstrated highly suspicious 
for adenocarcinoma, with resulting in mass effect and compression of the right 
ventricular trigone. 2. Additional third small peripherally enhancing lesion is demonstrated in the 
deep white matter of the right parietal lobe at the gray-white junction. 3. Remote left frontoparietal craniotomy changes with postoperative cavity along 
the posterior left frontal convexity and surrounding gliosis. 4. Fluid signal throughout mastoid air cells, left greater than right. CPT Code:  18739 Physical Exam: 
General - Well developed, well nourished, in no apparent distress. Pleasant and conversent. Cardiovascular - Regular rate and rhythm. Normal S1, S2 without murmurs, rubs, or gallops. Lungs - Clear to auscultation. Abdomen - Soft, nontender with normal bowel sounds. Extremities - Peripheral pulses intact. No edema and no rashes. Neurological examination Level of consciousness- more alert, able to follow commands, intubated Brain stem reflexes 
-Pupillary reflex to bright light: 3mm PERRL, brisk, right gaze palsy 
-Ciliospinal reflexes: na 
 -Oculovestibular and/or oculocephalic reflex response:na 
-Corneal reflex: present 
-Facial movement to painful stimulus at supraorbital nerve, temporomandibular joint:na 
-Cough/gag reflex: present Motor examination 
-Muscle tone: decreased muscle tone with atrophy in all extremities, flaccid in LUE/LLE, spontaneous movement in RUE/RLE, wiggles toes to command 
-Motor response to pain: withdraws from pain in RUE/RLE 
-Muscle stretch reflexes: +1 RUE, absent RLE, LUE/LLE 
-Response to plantar stimulation: flexor Signed By: Ana Laura Ramirez NP   
 Jil 10, 2019 I saw and examined the patient today with our nurse practitioner and I agree with her assessment and plan with the following addition/exceptions: 
 
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 Differential diagnosis in addition to adenocarcinoma may include lymphoma. Would like additional information with reference to patient's baseline quality of life 35 minutes of critical care decision making and planning and discussion with the team 
 
The patient's electroencephalogram which was analyzed and reported separately distinct from the above time consideration does demonstrate an intact occipital background alpha which is consistent with wakefulness. There is abnormal activity of a bihemispheric nature as expected

## 2019-06-10 NOTE — PROGRESS NOTES
Bedside shift change report given to [address] (oncoming nurse) by Madi Estevez RN (offgoing nurse). Report included the following information Kardex, Intake/Output, MAR, Recent Results, Med Rec Status and Cardiac Rhythm SR 1 HB c pacing at times. Decreased command following on right. Withdraws from pain on left. ETT/Vent FiO2 30%. Right arm PICC: LR @ 100 ml/hour infusing. Bryant patent, decreased UOP. Right chest tube to -20 suction, continuous. Ng tube Left nare, capped with skin intact around nares. SCDs. No DTIs/No pressure ulcers observed. Per report ordered CT of chest/abdomen/pelvis to be done during the day 6/11/2019 when CT is ready in house and not in portable building outside.

## 2019-06-10 NOTE — PROGRESS NOTES
Critical Care Daily Progress Note: 6/10/2019 [place] Admission Date: 6/8/2019 The patient's chart is reviewed and the patient is discussed with the staff. 
 
68 y.o.  female presents with Altered mental status, unspecified altered mental status type: new and requires workup Diagnosis management comments: EMS originally called out for stroke, change in mental status, describes rightward gaze, drooling, decreased responsiveness.  Transported [facility] Hospitalfor [facility] stroke, noted sudden decrease in patient's mentation just outside of hospital and elects today.  On arrival, patient is minimally responsive to painful stimuli with gurgling respirations  Concerned that patient may not be able to protect her airway.  She was intubated in the ER and sent for ct of head but upon arrival she developed bradycardia the asystole. CPR was initiated and only 1 epi was given with compressions for a a couple of minutes Subjective:  
 
Patient seen resting in bed, intubated and mechanically ventilated, not responsive or folowing commands. MRI done today, Neurology following. Currently having EEG done. Current Facility-Administered Medications Medication Dose Route Frequency  levETIRAcetam (KEPPRA) 500 mg in 0.9% sodium chloride 100 mL IVPB  500 mg IntraVENous Q12H  
 NUTRITIONAL SUPPORT ELECTROLYTE PRN ORDERS   Does Not Apply PRN  
 sodium chloride (NS) flush 30 mL  30 mL InterCATHeter Q8H  
 heparin (porcine) pf 900 Units  900 Units InterCATHeter Q8H  
 sodium chloride (NS) flush 30 mL  30 mL InterCATHeter PRN  
 heparin (porcine) pf 900 Units  900 Units InterCATHeter PRN  
 PHENYLephrine (PF)(RICK-SYNEPHRINE) 30 mg in 0.9% sodium chloride 250 mL infusion   mcg/min IntraVENous TITRATE  lactated Ringers infusion  100 mL/hr IntraVENous CONTINUOUS  
 ondansetron (ZOFRAN) injection 4 mg  4 mg IntraVENous Q4H PRN  
  NOREPINephrine (LEVOPHED) 4 mg/250 mL (16 mcg/mL) in NS infusion  2-16 mcg/min IntraVENous TITRATE  fentaNYL citrate (PF) injection 25 mcg  25 mcg IntraVENous Q4H PRN Review of Systems Unobtainable due to patient status. Objective:  
 
Vitals:  
 06/10/19 1231 06/10/19 1246 06/10/19 1300 06/10/19 1315 BP: 98/55 108/62 104/66 106/67 Pulse: 73 77 77 83 Resp:  Temp:      
SpO2: 90% 100% 100% 100% Weight:      
Height:      
 
 
Intake and Output:  
 1901 - 06/10 0700 In: 4073.5 [I.V.:4073.5] Out: 597 [MQWL] No intake/output data recorded. Physical Exam:         
Constitutional:  the patient is well developed and in no acute distress EENMT:  Sclera clear, oral mucosa moist 
Respiratory: Intubated and mechanically ventilated,  
Cardiovascular:  RRR without M,G,R 
Gastrointestinal: soft and non-tender; with positive bowel sounds. Musculoskeletal: cool without cyanosis. There is no lower extremity edema. Skin:  no jaundice or rashes, no wounds Neurologic: Intubated and mechanically ventilated, not following any commands Psychiatric:  Intubated and mechanically ventilated, no following commands VENT:  PRVC 18,  PEEP 10, , FIO2 35% LINES:  ETT, Right PICC line, Bryant catheter, NGT, right Chest tube DRIPS:  LR @ 100 mL/hr CXR: 6/10/2019 IMPRESSION:  Unchanged mid left lung atelectasis. MRI BRAIN W/WO CONTRAST:  6/10/2019 IMPRESSION 1. At least 2 right parieto-occipital isointense T2, peripherally enhancing 
lesions with with surrounding vasogenic edema are demonstrated highly suspicious 
for adenocarcinoma, with resulting in mass effect and compression of the right 
ventricular trigone. 2. Additional third small peripherally enhancing lesion is demonstrated in the 
deep white matter of the right parietal lobe at the gray-white junction. 3. Remote left frontoparietal craniotomy changes with postoperative cavity along the posterior left frontal convexity and surrounding gliosis. 4. Fluid signal throughout mastoid air cells, left greater than right. LAB Recent Labs  
  06/08/19 [address] GLUCPOC 122* Recent Labs  
  06/10/19 
0343 06/09/19 
0532 06/08/19 
1418 06/08/19 [address] WBC 7.4 10.8  --  8.8 HGB 12.0 12.7  --  14.2 HCT 35.5* 39.2  --  44.6  287  --  347 INR  --   --  0.9  --   
 
Recent Labs  
  06/10/19 
0343 06/09/19 
2348 06/09/19 
1741 06/09/19 
0532 06/08/19 [address]   --  142 143 139  
K 3.8  --  4.0 3.2* 3.9 *  --  114* 112* 105 CO2 19*  --  17* 18* 29  
GLU 93  --  103* 101* 126* BUN 11  --  11 14 15 CREA 0.16*  --  0.25* 0.32* 0.27* MG  --  2.6* 1.6* 2.0  --   
CA 8.3  --  8.0* 8.7 9.4 ALB  --   --   --   --  3.2 TBILI  --   --   --   --  0.6 ALT  --   --   --   --  15 SGOT  --   --   --   --  18 Recent Labs  
  06/10/19 
0515 06/09/19 
0406 06/08/19 
1555 PHI 7.424 7.436 7.407 PCO2I 22.8* 21.5* 28.1*  
PO2I 207* 330* 403* HCO3I 14.9* 14.5* 17.7* No results for input(s): LCAD, LAC in the last 72 hours. No results for input(s): PH, PCO2, PO2, HCO3 in the last 72 hours. Assessment:  (Medical Decision Making) Hospital Problems  Date Reviewed: 6/10/2019 Codes Class Noted POA Acute respiratory failure ([facility] [address] ICD-10-CM: J96.00 
ICD-9-CM: 518.81  6/8/2019 Unknown Intubated and mechanically ventilaed Cerebral palsy (HCC) (Chronic) ICD-10-CM: G80.9 ICD-9-CM: 343.9  6/8/2019 Yes Chronic Acute encephalopathy ICD-10-CM: G93.40 ICD-9-CM: 348.30  6/8/2019 Unknown MRI done today Cardiac arrest [facility]) ICD-10-CM: I46.9 ICD-9-CM: 427.5  6/8/2019 Unknown Mechanically ventilated, no command following Hypotension ICD-10-CM: I95.9 ICD-9-CM: 458.9  6/8/2019 Unknown Pressors off at this time Pneumothorax on right ICD-10-CM: J93.9 ICD-9-CM: 512.89  6/8/2019 Unknown Right chest tube intact Plan:  (Medical Decision Making) --Continue ventilator support --Appreciate Neurology following 
--MRI today concerning for two right parieto-occipital lesions, possibly adenocarcinoma 
--Continue right CT care 
--Patient a full code More than 50% of the time documented was spent in face-to-face contact with the patient and in the care of the patient on the floor/unit where the patient is located. Amberly Muller, NP Lungs: CTA b/l no wheezing Heart S1 and S2 audible, no murmers or rubs appreciated Other Mri abnormal and neurology started steroids. Appears to have some brain masses. Will get CT chest/abomen/pelvis to r/o source. Oncology requested per neurology as well. No family in room and when here shortly will discuss with them I have spoken with and examined the patient. I have reviewed the history, examination, assessment, and plan and agree with the above. Sajan Sosa MD 
 
 
This note was signed electronically. Errors are unfortunately her likely due to dictation software.

## 2019-06-11 NOTE — PROGRESS NOTES
Neurology Daily Progress Note Assessment:  
 
 
Multiple contrast enhancing lesions in the right parieto-occipital lobe with surrounding vasogenic edema and mass effect. Third lesion in right parietal lobe. Concerning for adenocarcinoma. CT of chest/abdomen/pelvis ordered.  
  
Focal seizures with impairment of consciousness- EEG pending. Continue Keppra IV. History of myotonic dystrophy Plan:  
 
Continue Keppra Continue dexamethasone CT chest/abdomen/pelvis ordered Subjective: Interval history: 
 
Patient awake, able to follow commands. Some movement on left side noted. History: 
 
Honey Cintron is a 77 y.o. female who is being seen for seizure. Unable to obtain ROS. Intubated. Objective:  
 
Vitals:  
 06/11/19 7087 06/11/19 7875 06/11/19 0701 06/11/19 2347 BP: 142/83 142/83 137/86 Pulse: 87 80 90 85 Resp: 26 18 (!) 37 16 Temp:  96.8 °F (36 °C) SpO2: 100% 100% 99% 99% Weight:      
Height:      
  
 
 
Current Facility-Administered Medications:  
  levETIRAcetam (KEPPRA) 500 mg in 0.9% sodium chloride 100 mL IVPB, 500 mg, IntraVENous, Q12H, McBurney, Anselmo Guerin, MD, Last Rate: 400 mL/hr at 06/10/19 2205, 500 mg at 06/10/19 2205   dexamethasone (DECADRON) 4 mg/mL injection 4 mg, 4 mg, IntraVENous, Q6H, Mandi Mitchell NP, 4 mg at 06/11/19 6931   famotidine (PF) (PEPCID) 20 mg in sodium chloride 0.9% 10 mL injection, 20 mg, IntraVENous, Q12H, Damon Gomez MD, 20 mg at 06/11/19 0838 
  NUTRITIONAL SUPPORT ELECTROLYTE PRN ORDERS, , Does Not Apply, PRN, Merlin Patterson MD 
  sodium chloride (NS) flush 30 mL, 30 mL, InterCATHeter, Q8H, Damon Gomez MD, 30 mL at 06/11/19 0600 
  heparin (porcine) pf 900 Units, 900 Units, InterCATHeter, Q8H, Damon Gomez MD, 900 Units at 06/11/19 1820   sodium chloride (NS) flush 30 mL, 30 mL, InterCATHeter, PRN, Damon Gomez MD 
   heparin (porcine) pf 900 Units, 900 Units, InterCATHeter, PRN, Wes Quiñones MD 
  lactated Ringers infusion, 100 mL/hr, IntraVENous, CONTINUOUS, Zeus BRYANT NP, Last Rate: 100 mL/hr at 06/11/19 0636, 100 mL/hr at 06/11/19 0636   ondansetron (ZOFRAN) injection 4 mg, 4 mg, IntraVENous, Q4H PRN, Amina Aguirre NP 
  fentaNYL citrate (PF) injection 25 mcg, 25 mcg, IntraVENous, Q4H PRN, Merlin Patterson MD, 25 mcg at 06/09/19 0024 Recent Results (from the past 12 hour(s)) POC G3 Collection Time: 06/11/19  3:56 AM  
Result Value Ref Range Device: VENT    
 FIO2 (POC) 30 % pH (POC) 7.373 7.35 - 7.45    
 pCO2 (POC) 22.0 (L) 35 - 45 MMHG  
 pO2 (POC) 137 (H) 75 - 100 MMHG  
 HCO3 (POC) 12.8 (L) 22 - 26 MMOL/L  
 sO2 (POC) 99 (H) 95 - 98 % Base deficit (POC) 11 mmol/L Mode Pressure regulated volume control Tidal volume 440 ml Set Rate 18 bpm  
 PEEP/CPAP (POC) 10 cmH2O Allens test (POC) NOT APPLICABLE Inspiratory Time 0.9 sec Site RIGHT BRACHIAL Patient temp. 98.6 Specimen type (POC) ARTERIAL Performed by Dean   
 CO2, POC 13 MMOL/L Respiratory comment: NurseNotified Exhaled minute volume 9.10 L/min COLLECT TIME 355 CBC WITH AUTOMATED DIFF Collection Time: 06/11/19  4:23 AM  
Result Value Ref Range WBC 6.1 4.3 - 11.1 K/uL  
 RBC 3.96 (L) 4.05 - 5.2 M/uL  
 HGB 11.4 (L) 11.7 - 15.4 g/dL HCT 34.5 (L) 35.8 - 46.3 % MCV 87.1 79.6 - 97.8 FL  
 MCH 28.8 26.1 - 32.9 PG  
 MCHC 33.0 31.4 - 35.0 g/dL  
 RDW 14.4 11.9 - 14.6 % PLATELET 159 598 - 728 K/uL MPV 10.6 9.4 - 12.3 FL ABSOLUTE NRBC 0.00 0.0 - 0.2 K/uL  
 DF AUTOMATED NEUTROPHILS 92 (H) 43 - 78 % LYMPHOCYTES 7 (L) 13 - 44 % MONOCYTES 1 (L) 4.0 - 12.0 % EOSINOPHILS 0 (L) 0.5 - 7.8 % BASOPHILS 0 0.0 - 2.0 % IMMATURE GRANULOCYTES 0 0.0 - 5.0 %  
 ABS. NEUTROPHILS 5.6 1.7 - 8.2 K/UL  
 ABS. LYMPHOCYTES 0.4 (L) 0.5 - 4.6 K/UL ABS. MONOCYTES 0.1 0.1 - 1.3 K/UL  
 ABS. EOSINOPHILS 0.0 0.0 - 0.8 K/UL  
 ABS. BASOPHILS 0.0 0.0 - 0.2 K/UL  
 ABS. IMM. GRANS. 0.0 0.0 - 0.5 K/UL METABOLIC PANEL, BASIC Collection Time: 06/11/19  4:23 AM  
Result Value Ref Range Sodium 141 136 - 145 mmol/L Potassium 3.8 3.5 - 5.1 mmol/L Chloride 111 (H) 98 - 107 mmol/L  
 CO2 18 (L) 21 - 32 mmol/L Anion gap 12 7 - 16 mmol/L Glucose 95 65 - 100 mg/dL BUN 10 8 - 23 MG/DL Creatinine <0.15 (L) 0.6 - 1.0 MG/DL  
 GFR est AA 0 (L) >60 ml/min/1.73m2 GFR est non-AA 0 (L) >60 ml/min/1.73m2 Calcium 8.5 8.3 - 10.4 MG/DL Physical Exam: 
General - Well developed, well nourished, in no apparent distress. Cardiovascular - Regular rate and rhythm. Normal S1, S2 without murmurs, rubs, or gallops. Lungs - Clear to auscultation. Abdomen - Soft, nontender with normal bowel sounds. Extremities - Peripheral pulses intact. No edema and no rashes.  
  
Neurological examination 
  
Level of consciousness- more alert, able to follow commands, intubated 
  
Brain stem reflexes 
-Pupillary reflex to bright light: 3mm PERRL, brisk, right gaze palsy 
-Ciliospinal reflexes: na 
-Oculovestibular and/or oculocephalic reflex response:na 
-Corneal reflex: present 
-Facial movement to painful stimulus at supraorbital nerve, temporomandibular joint:na 
-Cough/gag reflex: present 
  
Motor examination 
-Muscle tone: decreased muscle tone with atrophy in all extremities,  
-Motor response to pain: Moves extremities to command  
-Muscle stretch reflexes: n/a 
-Response to plantar stimulation: n/a Signed By: Franci Latham NP   
 June 11, 2019 Patient seen and examined's and discussed with relatives. Clearly is somewhat more responsive since we added dexamethasone yesterday. Have discussed the role of this agent in terms of decreasing brain edema. Additionally discussed the patient's situation with Dr. Luann Loya.   Clearly at the present time cannot protect her airway. Whether this is indeed a reflection of her underlying myotonic dystrophy or whether this is related to the brain edema will hopefully be clarified in the next day or 2. Usage of corticosteroids as I think an appropriate measure regardless of whether one is pursuing a palliative or more aggressive course of therapy

## 2019-06-11 NOTE — PROGRESS NOTES
, afib 
bp wnl. Dr. Darline Constantino notified Strips placed to chart Orders to give cardizem 10mg ivp and start cardizem gtt.

## 2019-06-11 NOTE — PROGRESS NOTES
MD Casas at bedside for morning rounding Updated on pt condition Alert and following commands Pt on pressure support RR 17 sat 98%

## 2019-06-11 NOTE — CONSULTS
Palliative Care Patient: Leonie Garcia MRN: [id number]  SSN: [id number] YOB: 1950  Age: 77 y.o. Sex: female Date of Request: 6/11/2019 Date of Consult:  6/11/2019 Reason for Consult:  goals of care Requesting Physician: Dr Dominick Shell Assessment/Plan:  
 
Principal Diagnosis:   
Altered Mental Status R41.82 Additional Diagnoses: · Acute Respiratory Failure, Unspecified  J96.00 
· Advance Care Planning Counseling Z71.89 
· Debility, Unspecified  R53.81 
· Edema  R60.9 · Failure to Thrive  R62.7 · Fatigue, Lethargy  R53.83 
· Frailty  R54 · Seizures, Convulsions  R56.9 
· Encounter for Palliative Care  Z51.5 Palliative Performance Scale (PPS): PPS: 30 Medical Decision Making:  
Reviewed and summarized notes from admission to present Discussed case with appropriate providers- ICU IDT, Dr Dominick Shell Reviewed laboratory and x-ray data from admission to present Pt intubated and ventilated, no distress noted.  and daughter at bedside. Introduced role of PC and reviewed events of hospitalization. Both have very good understanding of pt's condition. Daughter expressed that she did not want her mother to suffer, nor did the pt want to be kept alive long term of life support or with a feeding tube. They are considering a DNR for pt in the event she is extubated and does not do well, but want more time to think about it. Daughter voiced hope that the steroids would improve pt's condition enough that she will be successfully extubated. Reviewed plan for CT scan of CAP today, with hopes of finding primary source of cancer. Provided support, and assured them of our ongoing care. Will continue to follow. Will discuss findings with members of the interdisciplinary team.   
 
Thank you for this referral.    
 
  
. 
 
Subjective:  
 
History obtained from:  Family, Care Provider and Chart Chief Complaint: Seizure, brain masses History of Present Illness:  Ms Treasure Romero is a 78 yo  female with PMH of cerebral palsy, muscular dystrophy, and atrial fibrillation, who presented to the ER from home on 2019 with c/o AMS and stroke like symptoms. Family denied fall, LOC, n/v/d, fevers. Pt required intubation in the ER for airway protection. While in CT scan, pt had a cardiac arrest requiring CPR/ACLS, with ROSC. CT scan revealed multiple brain masses. Pt was admitted for further management. Neurosurgery evaluated pt, and did not feel surgical intervention was warranted. Neurology also evaluated pt, and EEG revealed seizure activity, and appropriate medications have been started. MRI of the brain confirmed multiple lesions suspicious for metastatic adenocarcinoma. Oncology consult pending, as well as CT scan of the CAP. Pt remains intubated and ventilated. Advance Directive: No      
Code Status:  Full Code Health Care Power of : No - Patient does not have a [address] Past Medical History:  
Diagnosis Date  Cerebral palsy ([facility] [address] 2019  Ill-defined condition   
 muscular dystrophy  Ill-defined condition Atrial fibrilation Past Surgical History:  
Procedure Laterality Date  ABDOMEN SURGERY PROC UNLISTED    
 laparotomy  HX  SECTION    
 HX ORTHOPAEDIC    
 left ankle, neck  HX OTHER SURGICAL    
 parotid  surgury  NEUROLOGICAL PROCEDURE UNLISTED    
 meningioma History reviewed. No pertinent family history. Social History Tobacco Use  Smoking status: Not on file Substance Use Topics  Alcohol use: No  
 
Prior to Admission medications Medication Sig Start Date End Date Taking? Authorizing Provider  
metoprolol tartrate (LOPRESSOR) 25 mg tablet Take 12.5 mg by mouth two (2) times a day. Lubna Combs MD  
polyethylene glycol (MIRALAX) 17 gram packet Take 17 g by mouth daily. Lubna Combs MD  
 
 
Allergies Allergen Reactions  Morphine Other (comments) \"cardiac arrest\" Review of Systems: 
Review of systems not obtained due to patient factors- AMS, intubated and ventilated Objective:  
 
Visit Vitals /86 Pulse 85 Temp 96.8 °F (36 °C) Resp 16 Ht 5' 3\" (1.6 m) Wt 125 lb 10.6 oz (57 kg) SpO2 99% BMI 22.26 kg/m² Physical Exam: 
 
General:  Intubated and ventilated. No acute distress. Eyes:  Conjunctivae/corneas clear Nose: Nares normal. Septum midline. NG tube Neck: Supple, symmetrical, trachea midline Lungs:   Clear to auscultation bilaterally Heart:  Regular rate and rhythm Abdomen:   Soft, non-tender, non-distended Extremities: Normal, atraumatic, no cyanosis. Generalized mild edema Skin: Skin color, texture, turgor normal.   
Neurologic: Follows simple commands, opens eyes to voice Psych: Unable Assessment:  
 
Hospital Problems  Date Reviewed: 6/10/2019 Codes Class Noted POA Acute respiratory failure ([facility] [address] ICD-10-CM: J96.00 
ICD-9-CM: 518.81  6/8/2019 Unknown Cerebral palsy ([facility]) (Chronic) ICD-10-CM: G80.9 ICD-9-CM: 343.9  6/8/2019 Yes * (Principal) Acute encephalopathy ICD-10-CM: G93.40 ICD-9-CM: 348.30  6/8/2019 Unknown Cardiac arrest [facility]) ICD-10-CM: I46.9 ICD-9-CM: 427.5  6/8/2019 Unknown Hypotension ICD-10-CM: I95.9 ICD-9-CM: 458.9  6/8/2019 Unknown Pneumothorax on right ICD-10-CM: J93.9 ICD-9-CM: 512.89  6/8/2019 Unknown Signed By: Deedee Denver, NP   
 June 11, 2019

## 2019-06-11 NOTE — PROGRESS NOTES
Bedside shift report w/ Elehowier Mediate RN Updated on pt condition VSS stable will continue to monitor

## 2019-06-11 NOTE — PROGRESS NOTES
Pt appears to be in controlled afib on monitor, bp wnl. Dr. Will Holland notified Orders received for ekg.

## 2019-06-11 NOTE — CONSULTS
[facility] Hematology & Oncology Inpatient Hematology / Oncology Consult NoteReason for Consult: Altered mental status [R41.82] Referring Physician:  Jeremie Gavin MD 
History of Present Illness: Ms. Carolyne Pérez is a 77 y.o. female admitted on 2019. PMH: caprice birch, afib. She presented to the ER with concern for stroke, change in mental status, decreased responsiveness. She was intubated in the ER upon arrival for airway protection. She ultimately went into asystole requiring CPR. She was admitted to ICU. CT head showed changed in right posterior frontal lobe, posterior temporal lobe, and right occipital lobe concerning for mass. MRI brain was done and showed right parieto-occipital isointense T2, peripherally enhancing lesions with with surrounding vasogenic edema are demonstrated highly suspicious for adenocarcinoma, with resulting in mass effect and compression of the right ventricular trigone. She was evaluated by neurosurgery and no intervention was indicated. Neurology saw patient and started high dose decadron. CT CAP is pending. We were consulted for concern for metastatic disease. Review of Systems: unable to obtain: patient intubated and sedated Allergies Allergen Reactions  Morphine Other (comments) \"cardiac arrest\" Past Medical History:  
Diagnosis Date  Cerebral palsy ([facility] 2019  Ill-defined condition   
 muscular dystrophy  Ill-defined condition Atrial fibrilation Past Surgical History:  
Procedure Laterality Date  ABDOMEN SURGERY PROC UNLISTED    
 laparotomy  HX  SECTION    
 HX ORTHOPAEDIC    
 left ankle, neck  HX OTHER SURGICAL    
 parotid  surgury  NEUROLOGICAL PROCEDURE UNLISTED    
 meningioma History reviewed. No pertinent family history. Social History Socioeconomic History  Marital status:  Spouse name: Not on file  Number of children: Not on file  Years of education: Not on file  Highest education level: Not on file Occupational History  Not on file Social Needs  Financial resource strain: Not on file  Food insecurity:  
  Worry: Not on file Inability: Not on file  Transportation needs:  
  Medical: Not on file Non-medical: Not on file Tobacco Use  Smoking status: Not on file Substance and Sexual Activity  Alcohol use: No  
 Drug use: No  
 Sexual activity: Not on file Lifestyle  Physical activity:  
  Days per week: Not on file Minutes per session: Not on file  Stress: Not on file Relationships  Social connections:  
  Talks on phone: Not on file Gets together: Not on file Attends Restorationism service: Not on file Active member of club or organization: Not on file Attends meetings of clubs or organizations: Not on file Relationship status: Not on file  Intimate partner violence:  
  Fear of current or ex partner: Not on file Emotionally abused: Not on file Physically abused: Not on file Forced sexual activity: Not on file Other Topics Concern  Not on file Social History Narrative  Not on file Current Facility-Administered Medications Medication Dose Route Frequency Provider Last Rate Last Dose  lactated Ringers infusion   mL/hr IntraVENous TITRATE Yuliya Casas MD      
 levETIRAcetam (KEPPRA) 500 mg in 0.9% sodium chloride 100 mL IVPB  500 mg IntraVENous Q12H Ricardo MITCHELL  mL/hr at 06/11/19 1120 500 mg at 06/11/19 1120  
 dexamethasone (DECADRON) 4 mg/mL injection 4 mg  4 mg IntraVENous Q6H Mandi Mitchell NP   4 mg at 06/11/19 1890  famotidine (PF) (PEPCID) 20 mg in sodium chloride 0.9% 10 mL injection  20 mg IntraVENous Q12H Brennen De Leon MD   20 mg at 06/11/19 0838  
 NUTRITIONAL SUPPORT ELECTROLYTE PRN ORDERS   Does Not Apply PRN Annie Butler MD      
  sodium chloride (NS) flush 30 mL  30 mL InterCATHeter Q8H Cheng Diaz MD   30 mL at 19 0600  
 heparin (porcine) pf 900 Units  900 Units InterCATHeter Q8H Cheng Diaz MD   900 Units at 19 4081  sodium chloride (NS) flush 30 mL  30 mL InterCATHeter PRN Cheng Diaz MD      
 heparin (porcine) pf 900 Units  900 Units InterCATHeter PRN Cheng Diaz MD      
 ondansetron [facility]) injection 4 mg  4 mg IntraVENous Q4H PRN Fabricio Luu, NP      
 fentaNYL citrate (PF) injection 25 mcg  25 mcg IntraVENous Q4H PRN Merlin Patterson MD   25 mcg at 19 0024 OBJECTIVE: 
Patient Vitals for the past 8 hrs: 
 BP Temp Pulse Resp SpO2  
19 1302 111/74  74 14 100 % 19 1246 116/72  70 17 100 % 19 1230 112/68  75 16 100 % 19 1201 112/72  83 24 100 % 19 1200   76 15 100 % 19 1157   76 19 100 % 19 1130 127/89  74 16 99 % 19 1101 127/70  78 19 99 % 19 1100   75 18 99 % 19 1030 112/71  75 15 98 % 19 1001 123/72  75 15 99 % 19 1000   77 18 99 % 19 0931 119/70  79 17 99 % 19 0930   77 15 99 % 19 0901 112/65  68 13 100 % 19 0900   68 14 100 % 19 0846   85 16 99 % 19 0830 118/73  80 16 99 % 19 0801 120/72  81 12 98 % 19 0701 137/86  90 (!) 37 99 % 19 0657 142/83 96.8 °F (36 °C) 80 18 100 % 19 0646 142/83  87 26 100 % 19 0630 111/61  79 17 99 % 19 0616 (!) 155/99  80 20 100 % 19 0601 106/66  66 18 100 % 19 0546 110/68  67 18 100 % Temp (24hrs), Av.5 °F (36.4 °C), Min:96.8 °F (36 °C), Max:97.9 °F (36.6 °C) 
 
 07 -  1900 In: 490 [I.V.:490] Out: 114 [Urine:100] Physical Exam: 
Constitutional: Ill appearing female in no acute distress, lying in bed HEENT: Normocephalic and atraumatic.  Oropharynx is clear, mucous membranes are moist.   Neck supple . ET tube intact Lymph node Deferred Skin + pallor. Warm and dry. No bruising and no rash noted. No erythema. Respiratory Lungs are clear to auscultation bilaterally without wheezes, rales or rhonchi, normal air exchange without accessory muscle use. CVS Tachycardic rate, regular rhythm and normal S1 and S2. No murmurs, gallops, or rubs. Abdomen Soft, nontender and nondistended, normoactive bowel sounds. No palpable mass. Neuro Following commands, opens eyes to verbal stimuli MSK  No edema and no tenderness. Psych Calm Labs:   
Recent Results (from the past 24 hour(s)) POC G3 Collection Time: 06/11/19  3:56 AM  
Result Value Ref Range Device: VENT    
 FIO2 (POC) 30 % pH (POC) 7.373 7.35 - 7.45    
 pCO2 (POC) 22.0 (L) 35 - 45 MMHG  
 pO2 (POC) 137 (H) 75 - 100 MMHG  
 HCO3 (POC) 12.8 (L) 22 - 26 MMOL/L  
 sO2 (POC) 99 (H) 95 - 98 % Base deficit (POC) 11 mmol/L Mode Pressure regulated volume control Tidal volume 440 ml Set Rate 18 bpm  
 PEEP/CPAP (POC) 10 cmH2O Allens test (POC) NOT APPLICABLE Inspiratory Time 0.9 sec Site RIGHT BRACHIAL Patient temp. 98.6 Specimen type (POC) ARTERIAL Performed by Dean   
 CO2, POC 13 MMOL/L Respiratory comment: NurseNotified Exhaled minute volume 9.10 L/min COLLECT TIME 355 CBC WITH AUTOMATED DIFF Collection Time: 06/11/19  4:23 AM  
Result Value Ref Range WBC 6.1 4.3 - 11.1 K/uL  
 RBC 3.96 (L) 4.05 - 5.2 M/uL  
 HGB 11.4 (L) 11.7 - 15.4 g/dL HCT 34.5 (L) 35.8 - 46.3 % MCV 87.1 79.6 - 97.8 FL  
 MCH 28.8 26.1 - 32.9 PG  
 MCHC 33.0 31.4 - 35.0 g/dL  
 RDW 14.4 11.9 - 14.6 % PLATELET 566 439 - 377 K/uL MPV 10.6 9.4 - 12.3 FL ABSOLUTE NRBC 0.00 0.0 - 0.2 K/uL  
 DF AUTOMATED NEUTROPHILS 92 (H) 43 - 78 % LYMPHOCYTES 7 (L) 13 - 44 % MONOCYTES 1 (L) 4.0 - 12.0 % EOSINOPHILS 0 (L) 0.5 - 7.8 % BASOPHILS 0 0.0 - 2.0 % IMMATURE GRANULOCYTES 0 0.0 - 5.0 %  
 ABS. NEUTROPHILS 5.6 1.7 - 8.2 K/UL  
 ABS. LYMPHOCYTES 0.4 (L) 0.5 - 4.6 K/UL  
 ABS. MONOCYTES 0.1 0.1 - 1.3 K/UL  
 ABS. EOSINOPHILS 0.0 0.0 - 0.8 K/UL  
 ABS. BASOPHILS 0.0 0.0 - 0.2 K/UL  
 ABS. IMM. GRANS. 0.0 0.0 - 0.5 K/UL METABOLIC PANEL, BASIC Collection Time: 06/11/19  4:23 AM  
Result Value Ref Range Sodium 141 136 - 145 mmol/L Potassium 3.8 3.5 - 5.1 mmol/L Chloride 111 (H) 98 - 107 mmol/L  
 CO2 18 (L) 21 - 32 mmol/L Anion gap 12 7 - 16 mmol/L Glucose 95 65 - 100 mg/dL BUN 10 8 - 23 MG/DL Creatinine <0.15 (L) 0.6 - 1.0 MG/DL  
 GFR est AA 0 (L) >60 ml/min/1.73m2 GFR est non-AA 0 (L) >60 ml/min/1.73m2 Calcium 8.5 8.3 - 10.4 MG/DL Imaging: XR CHEST SNGL V [[id number]] Collected: 06/11/19 0456 Order Status: Completed Updated: 06/11/19 0530 Narrative:    
EXAM: Chest x-ray. INDICATION: Dyspnea. COMPARISON: Yesterday's chest x-ray. TECHNIQUE: Frontal view chest x-ray. FINDINGS: There is unchanged mild left lung base atelectasis, and an air pocket 
overlying the cardiac silhouette which may relate to pneumomediastinum. The 
right lung is clear. No pneumothorax or pleural effusion is seen. The cardiac 
size is within normal limits, with an indwelling left chest wall pacemaker. The 
endotracheal tube, enteric tube and right arm PICC line remain in place. Impression:    
IMPRESSION: Unchanged left lung base atelectasis. XR CHEST SNGL V [[id number]] Order Status: No result XR CHEST SNGL V [[id number]] Order Status: No result CT CHEST ABD PELV WO CONT [[id number]] Order Status: No result MRI BRAIN W WO CONT [[id number]] Collected: 06/10/19 1112 Order Status: Completed Updated: 06/10/19 1257 Narrative:    
Clinical History: The patient is a 77years year old Female presenting with 
symptoms of Neuro deficit, acute, single, progressing Comparison:  Head CT 6/8/2019 Technique:   T2-weighted, T1-weighted, FLAIR, and diffusion-weighted transaxial 
, T1 sagittal, and gradient echo coronal pulse sequences were initially 
performed.  Following  the administration of contrast, additional T1-weighted 
transaxial and coronal sequences were performed. 9 mL of gadoterate meglumine (DOTAREM) 0.5 mmol/mL (376.9 mg/mL) contrast was administered intravenously. Findings:  
 
Age-related cortical involutional changes are seen. There are remote left 
posterior frontal parietal craniotomy changes with associated postoperative 
cavity in the posterior left frontal convexity demonstrating minimal surrounding 
gliosis. Postcontrast imaging is degraded by patient motion, however 
demonstrates an ill-defined peripherally enhancing 2 cm isointense T2 lesion in 
the right parietal occipital lobe highly suspicious for adenocarcinoma. A 
similar slightly larger adjacent 3.3 x 2.6 cm lesion is seen slightly more 
superiorly along the the periphery of the posterior right parietal lobe. There 
is associated surrounding vasogenic edema and mass effect compressing the right 
ventricular trigone however without temporal horn entrapment. A third small 
peripherally enhancing 5 mm lesion is demonstrated in the deeper right parietal 
white matter at the gray-white junction. There is diffuse mild dural enhancement 
which may be related to previous craniotomy. Slight increased T2 signal is 
suggested in both cerebral peduncles extending into the erick. There are no 
abnormal extra-axial fluid collections. There is no diffusion signal 
abnormality. Expected flow voids are maintained in the major intracranial 
vessels. The cerebellum and brainstem are otherwise unremarkable. There is no evidence of 
Chiari malformation. The ventricular system and CSF containing spaces are unremarkable in appearance. Visualized extracranial soft tissues are unremarkable. Mild left maxillary sinus mucosal inflammatory changes are seen. There is 
bilateral mastoid air cell fluid, left greater than right. Impression:    
Impression:   
1. At least 2 right parieto-occipital isointense T2, peripherally enhancing 
lesions with with surrounding vasogenic edema are demonstrated highly suspicious 
for adenocarcinoma, with resulting in mass effect and compression of the right 
ventricular trigone. 2. Additional third small peripherally enhancing lesion is demonstrated in the 
deep white matter of the right parietal lobe at the gray-white junction. 3. Remote left frontoparietal craniotomy changes with postoperative cavity along 
the posterior left frontal convexity and surrounding gliosis. 4. Fluid signal throughout mastoid air cells, left greater than right. CPT Code:  78613 XR CHEST SNGL V [[id number]] Collected: 06/10/19 2625 Order Status: Completed Updated: 06/10/19 8510 Narrative:    
EXAM: Chest x-ray. INDICATION: Dyspnea. COMPARISON: Yesterday's chest x-ray. TECHNIQUE: Frontal view chest x-ray. FINDINGS: Mild left lung base atelectasis is unchanged. The right lung is clear. The cardiac size is within normal limits. No pneumothorax or significant pleural 
effusion is seen. Support catheters remain in place. There is a left chest wall 
pacemaker. Impression:    
IMPRESSION: Unchanged mild left lung base atelectasis. XR CHEST SNGL V [[id number]] Collected: 06/09/19 7753 Order Status: Completed Updated: 06/09/19 3999 Narrative:    
EXAM: Chest x-ray. INDICATION: Pneumothorax. COMPARISON: Yesterday's chest x-ray. TECHNIQUE: Frontal view chest x-ray. FINDINGS: The previous tiny right apical pneumothorax is no longer identified, 
in this patient with an indwelling right-sided chest tube. Pneumomediastinum and 
left basilar atelectasis are unchanged. The endotracheal and enteric tubes remain in place, and appear to be in good position. Again noted is a left chest 
wall pacemaker. Impression:    
IMPRESSION: Resolved tiny right pneumothorax. XR CHEST SNGL V [[id number]] Collected: 06/08/19 2140 Order Status: Completed Updated: 06/08/19 2144 Narrative:    
EXAM: Chest x-ray. INDICATION: Pneumothorax. COMPARISON: Earlier study on the same day at 1946. TECHNIQUE: 2 frontal views of the chest were obtained. FINDINGS: Following placement of a right-sided chest tube there is a decreased 
right pneumothorax, with only trace apical residual. There is questionable trace 
residual pneumomediastinum as well. Left lung base atelectasis is unchanged. The 
cardiac size is within normal limits. Endotracheal and enteric tubes remain in 
place. There is a left chest wall pacemaker. Impression:    
IMPRESSION: Decreased right pneumothorax following placement of a chest tube, 
with only trace residual.   
XR CHEST SNGL V [[id number]] Collected: 06/08/19 2008 Order Status: Completed Updated: 06/08/19 2014 Narrative:    
EXAM: Chest x-ray. INDICATION: Pneumothorax. COMPARISON: Earlier study on the same day at 1501. TECHNIQUE: 2 frontal views of the chest were obtained. FINDINGS: The previously described pneumomediastinum and small to moderate right 
apical pneumothorax are unchanged. No left pneumothorax or significant pleural 
effusion is seen. There is persistent left lung base atelectasis. Cardiac size 
is within normal limits, with a left chest wall pacemaker in place. Endotracheal 
and enteric tubes appear to be in good position. Impression:    
IMPRESSION: Unchanged pneumomediastinum and right-sided pneumothorax. XR ABD (KUB) [[id number]] Collected: 06/08/19 1838 Order Status: Completed Updated: 06/08/19 1841 Narrative:    
Abdominal film for feeding tube placement, 6/8/2019 History: Feeding tube placement. Comparison: Chest x-ray 6/8/2019. Technique: Single AP view of the abdomen. Findings: A gastric feeding tube has been placed. This appears to enter the 
stomach with the tip overlying the distal stomach. The visualized bowel gas 
pattern is grossly nonspecific. Prior right pneumothorax and suspected 
pneumomediastinum are not felt to be significant changed. Impression:    
IMPRESSION: 
1. Good position of feeding tube as described above. XR CHEST PORT [[id number]] Collected: 06/08/19 1531 Order Status: Completed Updated: 06/08/19 1537 Narrative:    
CHEST X-RAY, single portable view  6/8/2019 History: Chest pain. Technique: Single frontal view of the chest. 
 
Comparison: Chest x-ray 6/8/2019 at 2:35 PM 
 
Findings: The heart is not enlarged. There is a new small to moderate size right 
pneumothorax. No significant asymmetry in lung volumes is seen to strongly 
suggest tension at this time. Lucency overlies the cardiac silhouette extending 
adjacent to the aortic arch. The appearance is felt to be most consistent with 
pneumomediastinum which may result from pneumothorax. No definite acute osseous 
abnormality is seen on this limited portable frontal chest x-ray. Impression:    
IMPRESSION:  
1.  New small to moderate right-sided pneumothorax. No clear asymmetry in lung 
volumes is seen to strongly suggest tension at this time. 2. Additional lucency overlying the cardiac silhouette extending adjacent to the 
aortic arch. The appearance is felt to be most consistent with additional 
pneumomediastinum which could result from pneumothorax. The new pneumothorax was discussed with Dr. Elieser Liao, so personally at 3:33 PM 
on 6/8/2019. CT HEAD WITHOUT CONTRAST [[id number]] Collected: 06/08/19 1507 Order Status: Completed Updated: 06/08/19 1519 Narrative:    
CT HEAD WITHOUT CONTRAST, 6/8/2019 History: Initial leftward gaze, and drooling. CODE BLUE. Comparison: CT head without contrast 9/16/2018 Technique:   5 mm axial scans from the skull base to the vertex. All CT scans 
performed at this facility use one or all of the following: Automated exposure 
control, adjustment of the mA and/or kVp according to patient's size, iterative 
reconstruction. Findings:  Although no clear acute dense hemorrhage is seen, there is abnormal 
attenuation seen in the right posterior frontal lobe, right posterior temporal 
lobe, and right occipital lobe which is incompletely characterized on this 
noncontrast study. There is an area of central density in the right occipital 
lobe seen on image 15 measuring 1.9 cm a 1.9 cm with adjacent edema which could 
either represent sequela of a subacute hemorrhage or mass at this level. Additional vague density is seen in the right posterior temporal region on image 21 which does appear to represent hemorrhage although is felt to be more 
subacute in its appearance.  No abnormal extra-axial fluid collections are seen. 
 .  No evidence of midline shift or herniation is seen.  No abnormal edema 
pattern is seen in a vascular distribution to suggest large artery infarction. Evaluation with bone windows shows no acute osseous changes.  The visualized 
sinuses, middle ears, and mastoid air cells are well aerated. Impression:    
IMPRESSION:   
1.  No acute intracranial process evident by noncontrast CT study of the head. these are similar in size to the prior study and no abnormal sulcal effacement 
is se in therefore this is felt to represent chronic extraocular dilation. No 
evidence for herniation is seen.  No abnormal edema pattern is seen in a 
vascular distribution to suggest large artery infarction. Stable CSF attenuation 
abnormality is seen at the left frontal lobe  which may be postsurgical given 
the  stable post surgical changes of the overlying calvarium . L Evaluation with bone windows shows no acute osseous changesmoderate fluid is seen in left mastoid air cells. IMPRESSION:   
1.  Abnormal attenuation changes in the right posterior frontal lobe, posterior 
temporal lobe, and right occipital lobe suboptimally characterized on this 
noncontrast study. An area of somewhat defined abnormal density is seen in the 
right occipital lobe which could either represent subacute hematoma  potential 
mass. Adjacent edema is seen. Additional vague density is seen in the right 
posterior frontal lobe and posterior temporal lobe whose appearance does suggest 
blood products  although is felt to have a more subacute appearance.  No 
evidence for acute hydrocephalus, or herniation is seen. A contrasted MRI of the 
brain may help better characterize  these changes.  
 
 These critical findings were discussed with  Dr. Rachel moura personally 
at 3:05 p.m. on 6/8/2019. XR CHEST PORT [[id number]] Collected: 06/08/19 1454 Order Status: Completed Updated: 06/08/19 1457 Narrative:    
CHEST X-RAY, single portable view  6/8/2019 History: Unresponsive. Technique: Single frontal view of the chest. 
 
Comparison: None. Findings: A left-sided intracardiac device is seen. The patient is intubated with 
endotracheal tube tip in good position located 8.2 cm above the mary. The 
cardiac silhouette is normal in respect to size.  The lungs are expanded without 
evidence for pneumothorax.  Left basilar focal airspace changes are seen which 
are felt to more so obscure the left diaphragm suggesting a left lower lobe 
airspace process. No significant associated pleural effusion is seen. Impression:    
IMPRESSION:  
1.  Good position of endotracheal tube. 2. Left lower lobe airspace process concerning for pneumonia given the apparent 
lobar distribution. ASSESSMENT: 
Problem List  Date Reviewed: 6/10/2019 Codes Class Noted [facility] ICD-10-CM: J96.00 
ICD-9-CM: 518.81  6/8/2019 Cerebral palsy (HCC) (Chronic) ICD-10-CM: G80.9 ICD-9-CM: 343.9  6/8/2019 * (Principal) Acute encephalopathy ICD-10-CM: G93.40 ICD-9-CM: 348.30  6/8/2019 Cardiac arrest [facility]) ICD-10-CM: I46.9 ICD-9-CM: 427.5  6/8/2019 Hypotension ICD-10-CM: I95.9 ICD-9-CM: 458.9  6/8/2019 Pneumothorax on right ICD-10-CM: J93.9 ICD-9-CM: 512.89  6/8/2019 RECOMMENDATIONS: 
Brain mass 
- Concern for metastatic disease. CT CAP pending. Continue high dose decadron. Neurology following. Consult rad-onc. Will need tissue to confirm diagnosis - TBD based on findings from CT scan Focal seizures - On keppra per neurology Pneumothorax - Right chest tube. Pulmonology following Lab studies and imaging studies (MRI brain) were personally reviewed. Thank you for allowing us to participate in the care of Ms. Low White. We will follow along Shawn Sam NP [facility] [address] [place] [address] Office : [phone] Fax : [phone] I personally saw, exammed and counselled the patient, and discussed with NP, agree with above history/assessment/plan. 77 y. o.female h/o meningioma s/p craniotomy went to ER with AMS and was coded, responded to CPR and intubated, head CT and MRI showed multiple brain lesions, neurosurgery rec no surgical intervention, decadron for symptoms control, CT for complete staging and search for primary site, consult rad onc once stable for transportation, seizure control and pneumo rx. Renan Arceo M.D. [place] [address], [address] [place] [address] Office : [phone] Fax : [phone]

## 2019-06-11 NOTE — PROGRESS NOTES
Critical Care Daily Progress Note: 6/11/2019 Jose Grayson Admission Date: 6/8/2019 The patient's chart is reviewed and the patient is discussed with the staff. 
 
68 y.o.  female presents with Altered mental status, unspecified altered mental status type: new and requires workup Diagnosis management comments: EMS originally called out for stroke, change in mental status, describes rightward gaze, drooling, decreased responsiveness.  Trans[facility] Hospital[facility], noted sudden decrease in patient's mentation just outside of hospital and elects today.  On arrival, patient is minimally responsive to painful stimuli with gurgling respirations  Concerned that patient may not be able to protect her airway.  She was intubated in the ER and sent for ct of head but upon arrival she developed bradycardia the asystole. CPR was initiated and only 1 epi was given with compressions for a a couple of minutes Subjective:  
 
Patient seen resting in bed, intubated and mechanically ventilated, more alert today. Did report no pain and no dyspnea. On PS now. Current Facility-Administered Medications Medication Dose Route Frequency  levETIRAcetam (KEPPRA) 500 mg in 0.9% sodium chloride 100 mL IVPB  500 mg IntraVENous Q12H  
 dexamethasone (DECADRON) 4 mg/mL injection 4 mg  4 mg IntraVENous Q6H  
 famotidine (PF) (PEPCID) 20 mg in sodium chloride 0.9% 10 mL injection  20 mg IntraVENous Q12H  
 NUTRITIONAL SUPPORT ELECTROLYTE PRN ORDERS   Does Not Apply PRN  
 sodium chloride (NS) flush 30 mL  30 mL InterCATHeter Q8H  
 heparin (porcine) pf 900 Units  900 Units InterCATHeter Q8H  
 sodium chloride (NS) flush 30 mL  30 mL InterCATHeter PRN  
 heparin (porcine) pf 900 Units  900 Units InterCATHeter PRN  
 lactated Ringers infusion  100 mL/hr IntraVENous CONTINUOUS  
 ondansetron (ZOFRAN) injection 4 mg  4 mg IntraVENous Q4H PRN  
  fentaNYL citrate (PF) injection 25 mcg  25 mcg IntraVENous Q4H PRN Review of Systems Unobtainable due to patient status. Objective:  
 
Vitals:  
 06/11/19 0630 06/11/19 7256 06/11/19 9447 06/11/19 0701 BP: 111/61 142/83 142/83 137/86 Pulse: 79 87 80 90 Resp: 17 26 18 (!) 37 Temp:   96.8 °F (36 °C) SpO2: 99% 100% 100% 99% Weight:      
Height:      
 
 
Intake and Output:  
06/09 1901 - 06/11 0700 In: 3964.4 [I.V.:3964.4] Out: 588 [Urine:555] No intake/output data recorded. Ventilator Settings Mode FIO2 Rate Tidal Volume Pressure PEEP PRVC  30 %    440 ml     10 cm H20 Peak airway pressure: 22.8 cm H2O Minute ventilation: 8 l/min ABG:  
Recent Labs  
  06/11/19 
0356 06/10/19 
0515 06/09/19 
0406 PHI 7.373 7.424 7.436 PCO2I 22.0* 22.8* 21.5*  
PO2I 137* 207* 330* HCO3I 12.8* 14.9* 14.5* Physical Exam:         
Constitutional:  the patient is well developed and in no acute distress EENMT:  Sclera clear, oral mucosa moist 
Respiratory: Intubated and mechanically ventilated,  
Cardiovascular:  RRR without M,G,R 
Gastrointestinal: soft and non-tender; with positive but low bowel sounds. Musculoskeletal: cool without cyanosis. There is no lower extremity edema. Skin:  no jaundice or rashes, no wounds Neurologic: Intubated and mechanically ventilated, following simple commands -- moving Right side hand and toes more then left. Psychiatric:  With difficulty responding to questions -- very limited LINES:  ETT, Right PICC line, Bryant catheter, NGT, right Chest tube DRIPS:  LR @ 100 mL/hr CXR: 6/11/19 with ETT noted and less congestion in right chest vs yesterday 6/10/2019 IMPRESSION:  Unchanged mid left lung atelectasis. MRI BRAIN W/WO CONTRAST:  6/10/2019 IMPRESSION 1. At least 2 right parieto-occipital isointense T2, peripherally enhancing 
lesions with with surrounding vasogenic edema are demonstrated highly suspicious for adenocarcinoma, with resulting in mass effect and compression of the right 
ventricular trigone. 2. Additional third small peripherally enhancing lesion is demonstrated in the 
deep white matter of the right parietal lobe at the gray-white junction. 3. Remote left frontoparietal craniotomy changes with postoperative cavity along 
the posterior left frontal convexity and surrounding gliosis. 4. Fluid signal throughout mastoid air cells, left greater than right. LAB Recent Labs  
  06/08/19 [address] GLUCPOC 122* Recent Labs  
  06/11/19 
0423 06/10/19 
0343 06/09/19 
0532 06/08/19 
1418 WBC 6.1 7.4 10.8  --   
HGB 11.4* 12.0 12.7  --   
HCT 34.5* 35.5* 39.2  --   
 207 287  --   
INR  --   --   --  0.9 Recent Labs  
  06/11/19 
0423 06/10/19 
0343 06/09/19 
2348 06/09/19 
1741 06/09/19 
0532 06/08/19 [address]  141  --  142 143 139  
K 3.8 3.8  --  4.0 3.2* 3.9 * 112*  --  114* 112* 105 CO2 18* 19*  --  17* 18* 29  
GLU 95 93  --  103* 101* 126* BUN 10 11  --  11 14 15 CREA <0.15* 0.16*  --  0.25* 0.32* 0.27* MG  --   --  2.6* 1.6* 2.0  --   
CA 8.5 8.3  --  8.0* 8.7 9.4 ALB  --   --   --   --   --  3.2 TBILI  --   --   --   --   --  0.6 ALT  --   --   --   --   --  15 SGOT  --   --   --   --   --  18 Recent Labs  
  06/11/19 
0356 06/10/19 
0515 06/09/19 
0406 PHI 7.373 7.424 7.436 PCO2I 22.0* 22.8* 21.5*  
PO2I 137* 207* 330* HCO3I 12.8* 14.9* 14.5* No results for input(s): LCAD, LAC in the last 72 hours. No results for input(s): PH, PCO2, PO2, HCO3 in the last 72 hours. Assessment:  (Medical Decision Making) Hospital Problems  Date Reviewed: 6/10/2019 Codes Class Noted POA Acute respiratory failure ([facility] [address] ICD-10-CM: J96.00 
ICD-9-CM: 518.81  6/8/2019 Unknown Intubated and mechanically ventilaed Cerebral palsy (HCC) (Chronic) ICD-10-CM: G80.9 ICD-9-CM: 343.9  6/8/2019 Yes Chronic Acute encephalopathy ICD-10-CM: G93.40 ICD-9-CM: 348.30  6/8/2019 Unknown MRI done today Cardiac arrest [facility]) ICD-10-CM: I46.9 ICD-9-CM: 427.5  6/8/2019 Unknown Mechanically ventilated, no command following Hypotension ICD-10-CM: I95.9 ICD-9-CM: 458.9  6/8/2019 Unknown Pressors off at this time Pneumothorax on right ICD-10-CM: J93.9 ICD-9-CM: 512.89  6/8/2019 Unknown Right chest tube intact Patient admitted with change in mental status and intubated. Was hypotensive. Also with spontaneous right sided PTX with chest tube placed. CT negative. EEG negative. MRI noted brain lesions. Evaluating if mets or not. Mental status is slow to recover Plan:  (Medical Decision Making) --continue PS trial 
--CT chest/abd/pelvis to r/o ?CA and brain lesions are mets --continue anti-epileptics --start tube feeding 
--continue chest tube -- no PTX today 
--oncology to see today 
--f/u with neurology -- help appreciated 
--will get palliative care to see -- per discussion with Daughter yesterday who is a Pediatrician. --Patient a full code More than 50% of the time documented was spent in face-to-face contact with the patient and in the care of the patient on the floor/unit where the patient is located.  
 
Kathi Cadet MD

## 2019-06-11 NOTE — PROGRESS NOTES
Ventilator check complete; patient has a #7. 0 ET tube secured at the 24 at the lip. Patient is sedated. Patient is not able to follow commands. Breath sounds are coarse and diminished. Trachea is midline, Negative for subcutaneous air, and chest excursion is symmetric. Patient is also Negative for cyanosis and is Negative for pitting edema. All alarms are set and audible. Resuscitation bag is at the head of the bed. Ventilator Settings Mode FIO2 Rate Tidal Volume Pressure PEEP I:E Ratio PRVC  30 %    440 ml     10 cm H20  1:2.7 Peak airway pressure: 22.8 cm H2O Minute [facility]

## 2019-06-11 NOTE — PROGRESS NOTES
Bedside shift change report given to [address] [place] [address] Lizzeth and Linden RN (oncoming nurse) by Ulises Lobo (offgoing nurse). Report included the following information Kardex, Intake/Output, MAR, Recent Results, Med Rec Status and Cardiac Rhythm 1 HB/Paced/PVCs/PACs. Decreased attention and decreased command following. Left side floppy but withdraws from pain. ETT/Vent with bloody sputum with inline suctioning. Left nare NG tube capped. Left nare skin intact. Bryant patent, continues to have low UOP. Right arm PICC:LR @ 100 ml/hour. SCDs. No DTIs/No pressure ulcers observed. Allevyn intact. Ankle wound with scab, healing.

## 2019-06-11 NOTE — INTERDISCIPLINARY ROUNDS
Interdisciplinary team rounds were held 6/11/2019 with the following team members:Care Management, Nursing, Nurse Practitioner, Nutrition, Palliative Care, Pastoral Care, Pharmacy, Physical Therapy, Respiratory Therapy and Clinical Coordinator and the patient. Plan of care discussed. See clinical pathway and/or care plan for interventions and desired outcomes.

## 2019-06-11 NOTE — PROGRESS NOTES
No change in assessment. VSS, afebrile. Right chest tube with no leaks and at -20cm suction. Bryant patent.

## 2019-06-11 NOTE — PROGRESS NOTES
12 Lead EKG ordered for pt 
MD Casas confirmed pt is in A fib No new orders given at this time Pt continues to be stable, will continue to monitor

## 2019-06-11 NOTE — PROGRESS NOTES
Ventilator check complete; patient has a #7. 0 ET tube secured at the 24 at the lip. Patient is not sedated. Patient is able to follow commands. Breath sounds are coarse. Trachea is midline, Negative for subcutaneous air, and chest excursion is symmetric. Patient is also Negative for cyanosis. All alarms are set and audible. Resuscitation bag is at the head of the bed. Ventilator Settings Mode FIO2 Rate Tidal Volume Pressure PEEP I:E Ratio Pressure support  30 %      8 cm H2O  10 cm H20  1:2.7 Peak airway pressure: 18.2 cm H2O Minute ventilation: 6.8 l/min Saurabh Ardon, RRT

## 2019-06-11 NOTE — PROGRESS NOTES
Nutrition: 
Nutrition Consult for TF Management. (Dr. Park Berumen) Assessment: Anthropometrics: Ht - 5'3\", wgt - 57 kg (CCU bed 6/11/19), BMI 22.3 c/w acceptable weight in a person >72years of age. , edema - 1+ generalized. Macronutrient Needs: 
Estimated calorie needs - 8324-0624 nilda/day (25-28 nilda/kg/day) Estimated protein needs -  gm pro/day (1.2-2 gm pro/kgIBW/day) (GFR >60 ml/min) Max CHO/day - 182 gm CHO/day (50% nilda/day) Fluid/day - 1.3-1.5 liters/day (1 ml/nilda/day) Intake/Comparative Standards: The patient is currently NPO which meets 0% of her calorie and 0% of her protein needs. Pertinent Labs/Hemodynamic Parameters: 
 Sodium 141, AM glucose 95; MAP - 85. Pertinent Medications/Drips: 
 Decadron; LR @ 100 ml/hr. GI Function/Activity: 
 Soft abdomen with hyperactive bowel sounds. Last bowel movement unknown. Diet: 
 NPO. Enteral Access: 
 NGT. Food/Nutrition History: 
 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 Head CT revealed brain masses. She was intubated on arrival to the ED and remains on ventilator support at this time. Diagnosis (Nutrition): Inadequate energy intake related to NPO status as evidenced by the patient being intubated and ventilated and requires TF for nutrition support. Intervention: 
Meals and Snacks: NPO. Enteral Nutrition: Start TF with Glucerna 1.2 @ 20 ml/hr with a 25 ml/hr water flush via NGT. Increase TF as tolerated to the goal rate of 50 ml/hr - 1440 calories/day (100% calorie goal), 72 grams protein/day (100% protein goal), 138 grams CHO/day (does not exceed max CHO limit) and 1572 ml water/day (>100% fluid goal). IV Fluid: (TF + water flush) + IVF = 100 ml/hr. Mineral Supplement Therapy: Nutrition Support Orders for Electrolyte Management Replacements are active and on the MAR. Coordination of Nutrition Care by a Nutrition Professional: AM CCU rounds, collaboration with Carlotta Haynes. Nutrition Discharge Plan: Too soon to determine. Getachew Mora. [facility] 
[phone]

## 2019-06-12 NOTE — PROGRESS NOTES
Dr. Criss Steve notified Mrs. Fernando Suggs has a thick white discharge from her vaginal area with +2 genital swelling and redness. Order obtained for Diflucan 200 mg now NG tube then Diflucan 100 mg Q day for 7 days via NG tube.

## 2019-06-12 NOTE — PROGRESS NOTES
Bedside shift report with [place] Updated on pt condition Pt drowsy, opens eyes to voice, and follows commands VSS stable, will continue to monitor pt condition

## 2019-06-12 NOTE — PROGRESS NOTES
Critical Care Daily Progress Note: 6/12/2019 Deana Mckeon Admission Date: 6/8/2019 The patient's chart is reviewed and the patient is discussed with the staff. 
 
68 y.o.  female presents with Altered mental status, unspecified altered mental status type: new and requires workup Diagnosis management comments: EMS originally called out for stroke, change in mental status, describes rightward gaze, drooling, decreased responsiveness.  [facility], noted sudden decrease in patient's mentation just outside of hospital and elects today.  On arrival, patient is minimally responsive to painful stimuli with gurgling respirations  Concerned that patient may not be able to protect her airway.  She was intubated in the ER and sent for ct of head but upon arrival she developed bradycardia the asystole. CPR was initiated and only 1 epi was given with compressions for a a couple of minutes Subjective:  
 
Patient seen resting in bed, intubated and mechanically ventilated, not currently alert. Not sedated. On PS 10/8 Current Facility-Administered Medications Medication Dose Route Frequency  fluconazole (DIFLUCAN) tablet 200 mg  200 mg Oral NOW  
 [START ON 6/13/2019] fluconazole (DIFLUCAN) tablet 100 mg  100 mg Oral DAILY  potassium phosphate 20 mmol in 0.9% sodium chloride 250 mL infusion   IntraVENous ONCE  
 lactated Ringers infusion   mL/hr IntraVENous TITRATE  dilTIAZem (CARDIZEM) 125 mg in dextrose 5% 125 mL infusion  0-15 mg/hr IntraVENous TITRATE  levETIRAcetam (KEPPRA) 500 mg in 0.9% sodium chloride 100 mL IVPB  500 mg IntraVENous Q12H  
 dexamethasone (DECADRON) 4 mg/mL injection 4 mg  4 mg IntraVENous Q6H  
 famotidine (PF) (PEPCID) 20 mg in sodium chloride 0.9% 10 mL injection  20 mg IntraVENous Q12H  
 NUTRITIONAL SUPPORT ELECTROLYTE PRN ORDERS   Does Not Apply PRN  
  sodium chloride (NS) flush 30 mL  30 mL InterCATHeter Q8H  
 heparin (porcine) pf 900 Units  900 Units InterCATHeter Q8H  
 sodium chloride (NS) flush 30 mL  30 mL InterCATHeter PRN  
 heparin (porcine) pf 900 Units  900 Units InterCATHeter PRN  
 ondansetron (ZOFRAN) injection 4 mg  4 mg IntraVENous Q4H PRN  
 fentaNYL citrate (PF) injection 25 mcg  25 mcg IntraVENous Q4H PRN Review of Systems Unobtainable due to patient status. Objective:  
 
Vitals:  
 06/12/19 0700 06/12/19 0716 06/12/19 0731 06/12/19 5860 BP: (!) 86/52 (!) 84/53  109/74 Pulse: 65 66 73 74 Resp: 13 12 15 16 Temp:    97.3 °F (36.3 °C) SpO2: 100% 100% 100% 100% Weight:      
Height:      
 
Intake and Output:  
06/10 1901 - 06/12 0700 In: 3691.5 [I.V.:3126.5] Out: 643 [ZNJEA:470] 06/12 0701 - 06/12 1900 In: 799 [I.V.:780] Out: 5 Ventilator Settings Mode FIO2 Rate Tidal Volume Pressure PEEP Pressure support  25 %    440 ml  8 cm H2O  10 cm H20 Peak airway pressure: 18.1 cm H2O Minute ventilation: 6.4 l/min ABG:  
Recent Labs  
  06/12/19 
0254 06/11/19 
0356 06/10/19 
0515 PHI 7.320* 7.373 7.424 PCO2I 29.2* 22.0* 22.8*  
PO2I 133* 137* 207* HCO3I 15.1* 12.8* 14.9* Physical Exam:         
Constitutional:  the patient is well developed and in no acute distress EENMT:  Sclera clear, oral mucosa moist 
Respiratory: Intubated and mechanically ventilated,  
Cardiovascular:  RRR without M,G,R 
Gastrointestinal: soft and non-tender; with positive but low bowel sounds. Musculoskeletal: cool without cyanosis. There is no lower extremity edema. Skin:  no jaundice or rashes, no wounds Neurologic: Intubated and mechanically ventilated, following simple commands -- moving Right side hand and toes more then left. Psychiatric:  With difficulty responding to questions -- very limited LINES:  ETT, Right PICC line, Bryant catheter, NGT, right Chest tube DRIPS:  LR @ 100 mL/hr 
 
 CXR: 6/12L: stable chest 
 
 
6/11/19 with ETT noted and less congestion in right chest vs yesterday 6/10/2019 IMPRESSION:  Unchanged mid left lung atelectasis. MRI BRAIN W/WO CONTRAST:  6/10/2019 IMPRESSION 1. At least 2 right parieto-occipital isointense T2, peripherally enhancing 
lesions with with surrounding vasogenic edema are demonstrated highly suspicious 
for adenocarcinoma, with resulting in mass effect and compression of the right 
ventricular trigone. 2. Additional third small peripherally enhancing lesion is demonstrated in the 
deep white matter of the right parietal lobe at the gray-white junction. 3. Remote left frontoparietal craniotomy changes with postoperative cavity along 
the posterior left frontal convexity and surrounding gliosis. 4. Fluid signal throughout mastoid air cells, left greater than right. LAB Recent Labs  
  06/11/19 
1655 GLUCPOC 95 Recent Labs  
  06/12/19 
0416 06/11/19 
0423 06/10/19 
2559 WBC 7.9 6.1 7.4 HGB 10.7* 11.4* 12.0 HCT 32.7* 34.5* 35.5*  168 207 Recent Labs  
  06/12/19 
0416 06/11/19 
1845 06/11/19 
0423 06/10/19 
0343 06/09/19 
2348   --  141 141  --   
K 3.4*  --  3.8 3.8  --   
*  --  111* 112*  --   
CO2 18*  --  18* 19*  --   
*  --  95 93  --   
BUN 10  --  10 11  --   
CREA 0.18*  --  <0.15* 0.16*  --   
MG 2.1 2.0  --   --  2.6* PHOS 1.4*  --   --   --   --   
CA 7.9*  --  8.5 8.3  --   
 
Recent Labs  
  06/12/19 
0254 06/11/19 
0356 06/10/19 
0515 PHI 7.320* 7.373 7.424 PCO2I 29.2* 22.0* 22.8*  
PO2I 133* 137* 207* HCO3I 15.1* 12.8* 14.9* No results for input(s): LCAD, LAC in the last 72 hours. No results for input(s): PH, PCO2, PO2, HCO3 in the last 72 hours. Assessment:  (Medical Decision Making) Hospital Problems  Date Reviewed: 6/10/2019 Codes Class Noted POA  Acute respiratory failure (HCC) ICD-10-CM: J96.00 
 ICD-9-CM: 518.81  6/8/2019 Unknown Intubated and mechanically ventilaed Cerebral palsy (HCC) (Chronic) ICD-10-CM: G80.9 ICD-9-CM: 343.9  6/8/2019 Yes Chronic Acute encephalopathy ICD-10-CM: G93.40 ICD-9-CM: 348.30  6/8/2019 Unknown MRI done today Cardiac arrest [facility]) ICD-10-CM: I46.9 ICD-9-CM: 427.5  6/8/2019 Unknown Mechanically ventilated, no command following Hypotension ICD-10-CM: I95.9 ICD-9-CM: 458.9  6/8/2019 Unknown Pressors off at this time Pneumothorax on right ICD-10-CM: J93.9 ICD-9-CM: 512.89  6/8/2019 Unknown Right chest tube intact Patient admitted with change in mental status and intubated. Was hypotensive. Also with spontaneous right sided PTX with chest tube placed. CT negative. EEG negative. MRI noted brain lesions. Evaluating if mets or not. Mental status is slow to recover Plan:  (Medical Decision Making) --continue PS trials 
--CT chest/abd/pelvis to r/o ?CA and brain lesions are mets 
--afib with RVR, trial low dose lopressor 12.5, BID 
--continue anti-epileptics --continue tube feeds 
--continue chest tube -- no PTX today; water seal 
--oncology following 
--f/u with neurology -- help appreciated 
--PC following 
--Patient a full code with ongoing discussions pending More than 50% of the time documented was spent in face-to-face contact with the patient and in the care of the patient on the floor/unit where the patient is located.  
 
Enedina Lamb MD

## 2019-06-12 NOTE — PROGRESS NOTES
Palliative Care Progress Note Patient: Jose Grayson MRN: [id number]  SSN: [id number] YOB: 1950  Age: 77 y.o. Sex: female Assessment/Plan: Chief Complaint/Interval History: sleeping, remains intubated and ventilated Principal Diagnosis: · Debility, Unspecified  R53.81 Additional Diagnoses: · Acute Respiratory Failure, Unspecified  J96.00 
· Edema  R60.9 · Failure to Thrive  R62.7 · Fatigue, Lethargy  R53.83 
· Frailty  R54 
· Encounter for Palliative Care  Z51.5 Palliative Performance Scale (PPS) PPS: 30 Medical Decision Making:  
Reviewed and summarized notes over last 24 hours Discussed case with appropriate providers- ICU IDT Reviewed laboratory and x-ray data- CBC, BMP Pt sleeping, remains intubated and ventilated.  and daughter at bedside. CT scan unable to be completed yesterday due to mechanical issues. Hopefully the CT machine will be operational in the next day or so. Oncology has evaluated pt, and recommends radiation oncology consult once she is extubated and stable for transport. Family denies needs at this time. Will continue to follow. Will discuss findings with members of the interdisciplinary team.   
 
  
More than 50% of this 15 minute visit was spent counseling and coordination of care as outlined above. Subjective:  
 
Review of Systems: 
Review of systems not obtained due to patient factors- sleeping, intubated and ventilated Objective:  
 
Visit Vitals /61 Pulse 72 Temp 97.3 °F (36.3 °C) Resp 17 Ht 5' 3\" (1.6 m) Wt 131 lb 6.3 oz (59.6 kg) SpO2 100% BMI 23.28 kg/m² Physical Exam: 
 
General:  Sleeping. Intubated and ventilated. Debilitated and frail Eyes:  Conjunctivae/corneas clear Nose: Nares normal. Septum midline. NG tube Neck: Supple, symmetrical, trachea midline Lungs:   Clear to auscultation bilaterally. Right chest tube Heart:  Regular rate and rhythm Abdomen:   Soft, non-tender, non-distended Extremities: Normal, atraumatic, no cyanosis. Generalized edema Skin: Skin color, texture, turgor normal.  
Neurologic: Deferred Psych: Sleeping Signed By: Maddie Oh NP   
 June 12, 2019

## 2019-06-12 NOTE — PROGRESS NOTES
Neurology Daily Progress Note Assessment:  
 
 
Multiple contrast enhancing lesions in the right parieto-occipital lobe with surrounding vasogenic edema and mass effect. Third lesion in right parietal lobe. Concerning for adenocarcinoma. CT of chest/abdomen/pelvis ordered.  
  
Focal seizures with impairment of consciousness- EEG pending. Continue Keppra IV. History of myotonic dystrophy Plan:  
 
Continue Keppra Continue dexamethasone CT chest/abdomen/pelvis ordered Subjective: Interval history: 
 
Patient awake, able to follow commands. Some movement on left side noted. History: 
 
Laly Choudhury is a 77 y.o. female who is being seen for seizure. Unable to obtain ROS. Intubated. Objective:  
 
Vitals:  
 06/12/19 0716 06/12/19 0731 06/12/19 0735 06/12/19 1000 BP: (!) 84/53  109/74 109/61 Pulse: 66 73 74 72 Resp: 12 15 16 Temp:   97.3 °F (36.3 °C) SpO2: 100% 100% 100% Weight:      
Height:      
  
 
 
Current Facility-Administered Medications:  
  [START ON 6/13/2019] fluconazole (DIFLUCAN) tablet 100 mg, 100 mg, Oral, DAILY, Tyrell Macias MD 
  potassium phosphate 20 mmol in 0.9% sodium chloride 250 mL infusion, , IntraVENous, ONCE, Tyrell Macias MD 
  metoprolol tartrate (LOPRESSOR) tablet 12.5 mg, 12.5 mg, Per NG tube, Q12H, Sine, Eula Diamond MD, 12.5 mg at 06/12/19 1000 
  dilTIAZem (CARDIZEM) 125 mg in dextrose 5% 125 mL infusion, 0-15 mg/hr, IntraVENous, TITRATE, Tyrell Macias MD, Stopped at 06/12/19 7953   levETIRAcetam (KEPPRA) 500 mg in 0.9% sodium chloride 100 mL IVPB, 500 mg, IntraVENous, Q12H, McBurney, Alla Bleak, MD, Last Rate: 400 mL/hr at 06/12/19 1001, 500 mg at 06/12/19 1001   dexamethasone (DECADRON) 4 mg/mL injection 4 mg, 4 mg, IntraVENous, Q6H, Mandi Mitchell NP, 4 mg at 06/12/19 1000   famotidine (PF) (PEPCID) 20 mg in sodium chloride 0.9% 10 mL injection, 20 mg, IntraVENous, Q12H, Jose Mcmillan MD, 20 mg at 06/12/19 0959 
  NUTRITIONAL SUPPORT ELECTROLYTE PRN ORDERS, , Does Not Apply, PRN, Merlin Patterson MD 
  sodium chloride (NS) flush 30 mL, 30 mL, InterCATHeter, Q8H, Jose Mcmillan MD, 30 mL at 06/12/19 4966   heparin (porcine) pf 900 Units, 900 Units, InterCATHeter, Q8H, Jose Mcmillan MD, 900 Units at 06/12/19 9492   sodium chloride (NS) flush 30 mL, 30 mL, InterCATHeter, PRN, Jose Mcmillan MD 
  heparin (porcine) pf 900 Units, 900 Units, InterCATHeter, PRN, Jose Mcmillan MD 
  ondansetron [facility]) injection 4 mg, 4 mg, IntraVENous, Q4H PRN, Sravanthi BRYANT NP, 4 mg at 06/11/19 2032 
  fentaNYL citrate (PF) injection 25 mcg, 25 mcg, IntraVENous, Q4H PRN, Merlin Patterson MD, 25 mcg at 06/09/19 0024 Recent Results (from the past 12 hour(s)) POC G3 Collection Time: 06/12/19  2:54 AM  
Result Value Ref Range Device: VENT    
 FIO2 (POC) 30 % pH (POC) 7.320 (L) 7.35 - 7.45    
 pCO2 (POC) 29.2 (L) 35 - 45 MMHG  
 pO2 (POC) 133 (H) 75 - 100 MMHG  
 HCO3 (POC) 15.1 (L) 22 - 26 MMOL/L  
 sO2 (POC) 99 (H) 95 - 98 % Base deficit (POC) 10 mmol/L Mode VENTILATOR/SPONTANEOUS/PRESSURE SUPPORT    
 PEEP/CPAP (POC) 10 cmH2O Pressure support 8 cmH2O Allens test (POC) YES Total resp. rate 16 Site RIGHT RADIAL Patient temp. 98.6 Specimen type (POC) ARTERIAL Performed by Rut   
 CO2, POC 16 MMOL/L Respiratory comment: NurseNotified Exhaled minute volume 6.50 L/min COLLECT TIME 247 CBC WITH AUTOMATED DIFF Collection Time: 06/12/19  4:16 AM  
Result Value Ref Range WBC 7.9 4.3 - 11.1 K/uL  
 RBC 3.73 (L) 4.05 - 5.2 M/uL  
 HGB 10.7 (L) 11.7 - 15.4 g/dL HCT 32.7 (L) 35.8 - 46.3 % MCV 87.7 79.6 - 97.8 FL  
 MCH 28.7 26.1 - 32.9 PG  
 MCHC 32.7 31.4 - 35.0 g/dL  
 RDW 14.4 11.9 - 14.6 % PLATELET 565 069 - 242 K/uL  MPV 10.2 9.4 - 12.3 FL  
 ABSOLUTE NRBC 0.00 0.0 - 0.2 K/uL  
 DF AUTOMATED NEUTROPHILS 92 (H) 43 - 78 % LYMPHOCYTES 4 (L) 13 - 44 % MONOCYTES 4 4.0 - 12.0 % EOSINOPHILS 0 (L) 0.5 - 7.8 % BASOPHILS 0 0.0 - 2.0 % IMMATURE GRANULOCYTES 1 0.0 - 5.0 %  
 ABS. NEUTROPHILS 7.2 1.7 - 8.2 K/UL  
 ABS. LYMPHOCYTES 0.3 (L) 0.5 - 4.6 K/UL  
 ABS. MONOCYTES 0.3 0.1 - 1.3 K/UL  
 ABS. EOSINOPHILS 0.0 0.0 - 0.8 K/UL  
 ABS. BASOPHILS 0.0 0.0 - 0.2 K/UL  
 ABS. IMM. GRANS. 0.0 0.0 - 0.5 K/UL METABOLIC PANEL, BASIC Collection Time: 06/12/19  4:16 AM  
Result Value Ref Range Sodium 141 136 - 145 mmol/L Potassium 3.4 (L) 3.5 - 5.1 mmol/L Chloride 112 (H) 98 - 107 mmol/L  
 CO2 18 (L) 21 - 32 mmol/L Anion gap 11 7 - 16 mmol/L Glucose 130 (H) 65 - 100 mg/dL BUN 10 8 - 23 MG/DL Creatinine 0.18 (L) 0.6 - 1.0 MG/DL  
 GFR est AA >60 >60 ml/min/1.73m2 GFR est non-AA >60 >60 ml/min/1.73m2 Calcium 7.9 (L) 8.3 - 10.4 MG/DL  
PHOSPHORUS Collection Time: 06/12/19  4:16 AM  
Result Value Ref Range Phosphorus 1.4 (L) 2.3 - 3.7 MG/DL MAGNESIUM Collection Time: 06/12/19  4:16 AM  
Result Value Ref Range Magnesium 2.1 1.8 - 2.4 mg/dL Physical Exam: 
General - Well developed, well nourished, in no apparent distress. Cardiovascular - Regular rate and rhythm. Normal S1, S2 without murmurs, rubs, or gallops. Lungs - Clear to auscultation. Abdomen - Soft, nontender with normal bowel sounds. Extremities - Peripheral pulses intact. No edema and no rashes.  
  
Neurological examination 
  
Level of consciousness- more alert, able to follow commands, intubated 
  
Brain stem reflexes 
-Pupillary reflex to bright light: 3mm PERRL, brisk, right gaze palsy 
-Ciliospinal reflexes: na 
-Oculovestibular and/or oculocephalic reflex response:na 
-Corneal reflex: present 
-Facial movement to painful stimulus at supraorbital nerve, temporomandibular joint:na 
-Cough/gag reflex: present 
  
Motor examination -Muscle tone: decreased muscle tone with atrophy in all extremities,  
-Motor response to pain: Moves extremities to command  
-Muscle stretch reflexes: n/a 
-Response to plantar stimulation: n/a Signed By: Mey Boothe MD   
 June 12, 2019 Patient seen and examined's and discussed with relatives. Clearly is somewhat more responsive since we added dexamethasone yesterday. Have discussed the role of this agent in terms of decreasing brain edema. Additionally discussed the patient's situation with Dr. Kiara Fu. Clearly at the present time cannot protect her airway. Whether this is indeed a reflection of her underlying myotonic dystrophy or whether this is related to the brain edema will hopefully be clarified in the next day or 2. Usage of corticosteroids as I think an appropriate measure regardless of whether one is pursuing a palliative or more aggressive course of therapy

## 2019-06-12 NOTE — PROGRESS NOTES
Spoke with CT tech, CT currently working. Current plan is to stop tube feeding now, give contrast in ~3 hours, and CT to follow.

## 2019-06-12 NOTE — PROGRESS NOTES
Bedside, verbal, and written report received from Salvador Morales [facility]. Patient resting in bed,  at bedside. Resting quietly on ventilator. Opens eyes to voice, moves R hand spontaneously. Bryant patent and draining. No continuous gtts. TF infusing at 30 ml/hr via NGT. Will increase per protocol. Bryant patent and draining. R chest tube to water seal.  
R arm PICC dressing c/d/i.  
VSS.

## 2019-06-12 NOTE — PROGRESS NOTES
Shift assessment completed. Axillary temp 95.8. Warm blankets provided. Will postpone bath at this time due to temperature.

## 2019-06-12 NOTE — PROGRESS NOTES
A follow up visit was made to the patient. Emotional support, spiritual presence and  
prayer were provided for the patient, her , Stevenson Kaba, and her daughter, Tabby Crandall. The patient was not alert. Virginia Yu

## 2019-06-12 NOTE — PROGRESS NOTES
Bedside report received from Aleyda Duron, RN  
Pt resting quietly, responds to voice 
cardizem currently on hold, hr 64 paced 
bp 86/52 map 64 Will continue assessment and care.

## 2019-06-12 NOTE — PROGRESS NOTES
Progress Note Patient: Honey Cintron MRN: [id number]  SSN: [id number] YOB: 1950  Age: 77 y.o. Sex: female Admit Date: 6/8/2019 LOS: 4 days Subjective:  
 
Not a great deal of change since yesterday Objective:  
 
Vitals:  
 06/12/19 0716 06/12/19 0731 06/12/19 0735 06/12/19 1000 BP: (!) 84/53  109/74 109/61 Pulse: 66 73 74 72 Resp: 12 15 16 Temp:   97.3 °F (36.3 °C) SpO2: 100% 100% 100% Weight:      
Height:      
  
 
Intake and Output: 
Current Shift: 06/12 0701 - 06/12 1900 In: 947 [I.V.:898] Out: 5 Last three shifts: 06/10 1901 - 06/12 0700 In: 3691.5 [I.V.:3126.5] Out: 643 [WBDDO:727] Physical Exam:  
Alert and clearly follows commands,  and is stronger on the right than on the left. This is consistent with her electroencephalogram which clearly demonstrated toward normal waking background, and clearly consistent additionally with her the location of her pathology. She is wiggling her right toes better than her left. Still has significant issues from the standpoint of sustaining respiration. She does not have significant myotonia on individual muscle testing Has facial diapiresis of a typical myotonic dystrophic type Lab/Data Review: 
Recent Results (from the past 24 hour(s)) EKG, 12 LEAD, SUBSEQUENT Collection Time: 06/11/19  3:32 PM  
Result Value Ref Range Ventricular Rate 83 BPM  
 Atrial Rate 416 BPM  
 QRS Duration 110 ms  
 Q-T Interval 386 ms QTC Calculation (Bezet) 453 ms Calculated R Axis 54 degrees Calculated T Axis 92 degrees Diagnosis Atrial fibrillation with premature ventricular or aberrantly conducted  
complexes Abnormal ECG When compared with ECG of 08-JUN-2019 15:06, Atrial fibrillation has replaced Sinus rhythm 
(RBBB and left anterior fascicular block) is no longer Present Confirmed by Latonia Aiken MD (), SHAW DAVIS ([id number]) on 6/11/2019 4:49:58 PM 
  
GLUCOSE, POC  
 Collection Time: 06/11/19  4:55 PM  
Result Value Ref Range Glucose (POC) 95 65 - 100 mg/dL MAGNESIUM Collection Time: 06/11/19  6:45 PM  
Result Value Ref Range Magnesium 2.0 1.8 - 2.4 mg/dL POC G3 Collection Time: 06/12/19  2:54 AM  
Result Value Ref Range Device: VENT    
 FIO2 (POC) 30 % pH (POC) 7.320 (L) 7.35 - 7.45    
 pCO2 (POC) 29.2 (L) 35 - 45 MMHG  
 pO2 (POC) 133 (H) 75 - 100 MMHG  
 HCO3 (POC) 15.1 (L) 22 - 26 MMOL/L  
 sO2 (POC) 99 (H) 95 - 98 % Base deficit (POC) 10 mmol/L Mode VENTILATOR/SPONTANEOUS/PRESSURE SUPPORT    
 PEEP/CPAP (POC) 10 cmH2O Pressure support 8 cmH2O Allens test (POC) YES Total resp. rate 16 Site RIGHT RADIAL Patient temp. 98.6 Specimen type (POC) ARTERIAL Performed by Rut   
 CO2, POC 16 MMOL/L Respiratory comment: NurseNotified Exhaled minute volume 6.50 L/min COLLECT TIME 247 CBC WITH AUTOMATED DIFF Collection Time: 06/12/19  4:16 AM  
Result Value Ref Range WBC 7.9 4.3 - 11.1 K/uL  
 RBC 3.73 (L) 4.05 - 5.2 M/uL  
 HGB 10.7 (L) 11.7 - 15.4 g/dL HCT 32.7 (L) 35.8 - 46.3 % MCV 87.7 79.6 - 97.8 FL  
 MCH 28.7 26.1 - 32.9 PG  
 MCHC 32.7 31.4 - 35.0 g/dL  
 RDW 14.4 11.9 - 14.6 % PLATELET 615 314 - 588 K/uL MPV 10.2 9.4 - 12.3 FL ABSOLUTE NRBC 0.00 0.0 - 0.2 K/uL  
 DF AUTOMATED NEUTROPHILS 92 (H) 43 - 78 % LYMPHOCYTES 4 (L) 13 - 44 % MONOCYTES 4 4.0 - 12.0 % EOSINOPHILS 0 (L) 0.5 - 7.8 % BASOPHILS 0 0.0 - 2.0 % IMMATURE GRANULOCYTES 1 0.0 - 5.0 %  
 ABS. NEUTROPHILS 7.2 1.7 - 8.2 K/UL  
 ABS. LYMPHOCYTES 0.3 (L) 0.5 - 4.6 K/UL  
 ABS. MONOCYTES 0.3 0.1 - 1.3 K/UL  
 ABS. EOSINOPHILS 0.0 0.0 - 0.8 K/UL  
 ABS. BASOPHILS 0.0 0.0 - 0.2 K/UL  
 ABS. IMM. GRANS. 0.0 0.0 - 0.5 K/UL METABOLIC PANEL, BASIC Collection Time: 06/12/19  4:16 AM  
Result Value Ref Range Sodium 141 136 - 145 mmol/L  Potassium 3.4 (L) 3.5 - 5.1 mmol/L  
 Chloride 112 (H) 98 - 107 mmol/L  
 CO2 18 (L) 21 - 32 mmol/L Anion gap 11 7 - 16 mmol/L Glucose 130 (H) 65 - 100 mg/dL BUN 10 8 - 23 MG/DL Creatinine 0.18 (L) 0.6 - 1.0 MG/DL  
 GFR est AA >60 >60 ml/min/1.73m2 GFR est non-AA >60 >60 ml/min/1.73m2 Calcium 7.9 (L) 8.3 - 10.4 MG/DL  
PHOSPHORUS Collection Time: 06/12/19  4:16 AM  
Result Value Ref Range Phosphorus 1.4 (L) 2.3 - 3.7 MG/DL MAGNESIUM Collection Time: 06/12/19  4:16 AM  
Result Value Ref Range Magnesium 2.1 1.8 - 2.4 mg/dL Assessment:  
 
Principal Problem: 
  Acute encephalopathy (6/8/2019) Active Problems: 
  Acute respiratory failure ([address] (6/8/2019) Cerebral palsy ([address] (6/8/2019) Cardiac arrest ([address] (6/8/2019) Hypotension (6/8/2019) Pneumothorax on right (6/8/2019) Plan:  
 
Still awaiting CT scan chest secondary to long time downtime on CT scanner, though with the second instrument only being portable van  and not appropriate for ICU usage She does not have myotonia in terms of her history of myotonic dystrophy. Undoubtedly however her current  respiratory embarrassment reflects the presence of underlying muscle disease. Her facial appearance is typically that of myotonic dystrophy with the characteristic hatchet face Signed By: Ubaldo Ceja MD   
 June 12, 2019

## 2019-06-12 NOTE — PROGRESS NOTES
Bedside shift change report given to [address] (oncoming nurse) by Al Detal (offgoing nurse). Report included the following information Kardex, Intake/Output, MAR, Recent Results, Med Rec Status and Cardiac Rhythm Paced/Atrial fibrillation/pvc/pac. More alert today and nodding appropriately. Left side much weaker than right side. Per report Lona Domingo is OK for bilateral mittens to be off. Decreased command following. ETT/Vent on 30%. Trace-+1 Generalized edema. Left nare NG tube c TF infusing, tolerating. Bryant patent. Right arm PICC: Cardizem IV started today due to uncontrolled atrial fib, LR titrate to TF infusion for total of 100 ml/hour. SCDs. Right old ankle wound with scab, healing. No DTIs/No pressure ulcers observed. Allevyn intact to sacrum.

## 2019-06-12 NOTE — PROGRESS NOTES
Ventilator check complete; patient has a #7. 0 ET tube secured at the 22 at the teeth. Patient is able to follow commands. Breath sounds are coarse and rhonchi. Trachea is midline, Negative for subcutaneous air, and chest excursion is symmetric. Patient is also Negative for cyanosis and is Positive for pitting edema. All alarms are set and audible. Resuscitation bag is at the head of the bed. Ventilator Settings Mode FIO2 Rate Tidal Volume Pressure PEEP I:E Ratio Pressure support  25 %     8 cm H2O  10 cm H20 Peak airway pressure: 18.1 cm H2O Minute ventilation: 6.4 l/min ABG: No results for input(s): PH, PCO2, PO2, HCO3 in the last 72 hours.  
 
 
Janki Welch, RT

## 2019-06-12 NOTE — PROGRESS NOTES
Bedside shift report with Faraz Mendoza Updated on pt condition VSS stable Pt drowsy, follows commands Pt turned

## 2019-06-12 NOTE — PROGRESS NOTES
Ventilator check complete; patient has a #7. 0 ET tube secured at the 24 at the lip. Patient is sedated. Patient is not able to follow commands. Breath sounds are coarse. Trachea is midline, Negative for subcutaneous air, and chest excursion is symmetric. Patient is also Negative for cyanosis. All alarms are set and audible. Resuscitation bag is at the head of the bed. Ventilator Settings Mode FIO2 Rate Tidal Volume Pressure PEEP I:E Ratio Pressure support, CPAP  30.5 %(Changed to 25% post ABG)      8 cm H2O  10 cm H20  1:2.7 Peak airway pressure: 18 cm H2O Minute ventilation: 6.9 l/min ABG: No results for input(s): PH, PCO2, PO2, HCO3 in the last 72 hours. Cori Litten

## 2019-06-13 NOTE — PROGRESS NOTES
A follow up visit was made to the patient. Emotional support, spiritual presence and  
prayer were provided. The patient's nurse shared that the patient's family requested the  to come and share a prayer for the patient because she was going to have comfort care implemented for her this afternoon. Dee Yu

## 2019-06-13 NOTE — PROGRESS NOTES
Family at bedside and would like to pursue comfort care at this time. Orders received from [facility], NP with Palliative care, Dr. Violet Rdz aware. Patient pre-medicated and RT called to bedside for compassionate extubation.

## 2019-06-13 NOTE — PROGRESS NOTES
Patient unresponsive at [phone], no pulse audible through auscultation and no breath sounds. Dr. Vero Hunter notified, MD to bedside.

## 2019-06-13 NOTE — PROGRESS NOTES
Palliative Care Progress Note Patient: Kim Nelson MRN: [id number]  SSN: [id number] YOB: 1950  Age: 77 y.o. Sex: female Assessment/Plan: Chief Complaint/Interval History: remains intubated and ventilated. CT CAP did not reveal primary source of cancer Principal Diagnosis: · Debility, Unspecified  R53.81 Additional Diagnoses: · Acute Respiratory Failure, Unspecified  J96.00 
· Edema  R60.9 · Failure to Thrive  R62.7 · Fatigue, Lethargy  R53.83 
· Frailty  R54 
· Encounter for Palliative Care  Z51.5 Palliative Performance Scale (PPS) PPS: 30 Medical Decision Making:  
Reviewed and summarized notes over last 24 hours Discussed case with appropriate providers- ICU IDT; Jermaine Romo Reviewed laboratory and x-ray data- CBC, BMP, CT Pt sleeping, remains intubated and ventilated.  and daughter at bedside. Family has spoken with Dr Ct Kang and know the results of CT scan. Family states pt would not want to be kept alive on machines in this state, and feel she is suffering. Family requests compassionate extubation and comfort measures. Counseled on the process of extubation and medications used for comfort. Advised that it is very difficult to predict how long pt will live post extubation. Orders adjusted for comfort. Family is going home to see pt's son, who also has muscular dystrophy, and will return after lunch- they wish to proceed with extubation then. Discussed with Norah Washington RN. Will continue to follow. Will discuss findings with members of the interdisciplinary team.   
 
  
More than 50% of this 35 minute visit was spent counseling and coordination of care as outlined above. Subjective:  
 
Review of Systems: 
Review of systems not obtained due to patient factors- sleeping, intubated and ventilated Objective:  
 
Visit Vitals /70 Pulse 68 Temp 98.4 °F (36.9 °C) Resp 15 Ht 5' 3\" (1.6 m) Wt 125 lb 14.1 oz (57.1 kg) SpO2 100% BMI 22.30 kg/m² Physical Exam: 
 
General:  Sleeping. Intubated and ventilated. Debilitated and frail Eyes:  Conjunctivae/corneas clear Nose: Nares normal. Septum midline. NG tube Neck: Supple, symmetrical, trachea midline Lungs:   Clear to auscultation bilaterally. Right chest tube Heart:  Regular rate and rhythm Abdomen:   Soft, non-tender, non-distended Extremities: Normal, atraumatic, no cyanosis. Generalized edema Skin: Skin color, texture, turgor normal.  
Neurologic: Deferred Psych: Sleeping Signed By: Jorge Alex NP   
 June 13, 2019

## 2019-06-13 NOTE — PROGRESS NOTES
Ventilator check complete; patient has a #7. 0 ET tube secured at the 22 at the lip. Patient is able to follow commands. Breath sounds are coarse. Trachea is midline, Negative for subcutaneous air, and chest excursion is symmetric. Patient is also Negative for cyanosis and is Positive for pitting edema. All alarms are set and audible. Resuscitation bag is at the head of the bed. Ventilator Settings Mode FIO2 Rate Tidal Volume Pressure PEEP I:E Ratio PRVC  25.2 %    450 ml  8 cm H2O  8 cm H20  1:3.45 Peak airway pressure: 19.1 cm H2O Minute ventilation: 6.8 l/min ABG: No results for input(s): PH, PCO2, PO2, HCO3 in the last 72 hours. Veronica Valles

## 2019-06-13 NOTE — PROGRESS NOTES
Death Note Fercho Michaels Admission date:  2019 Admitting Diagnosis:  Altered mental status [R41.82] Called to evaluate patient who was found by nursing to have . On arrival patient was found to be unresponsive. Physical exam was performed and the patient was noted to be apneic, asystolic, pupils were fixed and dilated, with absent occulocephalic reflex. Patient pronounced dead at 12 on 2019. Cause of death felt to be acute hypoxemic respiratory failure.  
 
Alyssa Alston MD

## 2019-06-13 NOTE — PROGRESS NOTES
Patient compassionately extubated at 14:09 per family request. Placed on 3L NC, SpO2 92%. Family at bedside.

## 2019-06-13 NOTE — INTERDISCIPLINARY ROUNDS
Interdisciplinary team rounds were held 6/13/2019 with the following team members:Care Management, Nursing, Nurse Practitioner, Nutrition, Palliative Care, Pastoral Care, Pharmacy, Physical Therapy, Physician, Respiratory Therapy, Speech Therapy and Clinical Coordinator and the patient. Plan of care discussed. See clinical pathway and/or care plan for interventions and desired outcomes.

## 2019-06-13 NOTE — PROGRESS NOTES
Neurology Daily Progress Note Assessment:  
 
 
Multiple contrast enhancing lesions in the right parieto-occipital lobe with surrounding vasogenic edema and mass effect. Third lesion in right parietal lobe. Concerning for adenocarcinoma. CT of chest/abdomen/pelvis negative for primary source  
  
Focal seizures with impairment of consciousness- EEG pending. Continue Keppra IV. History of myotonic dystrophy The family expresses a desire to pursue palliative treatment and does not wish to pursue further invasive testing at this time. Plan:  
 
Continue Keppra Continue dexamethasone Subjective: Interval history: 
 
Patient drowsy. Able to follow commands. Left arm cool. CT chest/abdomen/pelvis negative for primary source. Discussed pursuing LP with family, which the declined. Report that patient's function prior to hospitalization was low and that she would like to pursue palliative treatment. History: 
 
Juan Escalera is a 77 y.o. female who is being seen for seizure. Unable to obtain ROS. Intubated. Objective:  
 
Vitals:  
 06/13/19 0759 06/13/19 0801 06/13/19 0830 06/13/19 0901 BP:  133/72 93/66 108/69 Pulse: 86 66 65 66 Resp: 17 22 15 15 Temp:      
SpO2: 100% 99% 100% 100% Weight:      
Height:      
  
 
 
Current Facility-Administered Medications:  
  fluconazole (DIFLUCAN) tablet 100 mg, 100 mg, Oral, DAILY, Kenn Palomino MD, 100 mg at 06/13/19 0949 
  metoprolol tartrate (LOPRESSOR) tablet 12.5 mg, 12.5 mg, Per NG tube, Q12H, Hina Watkins MD, 12.5 mg at 06/13/19 1004   dilTIAZem (CARDIZEM) 125 mg in dextrose 5% 125 mL infusion, 0-15 mg/hr, IntraVENous, TITRATE, Kenn Palomino MD, Stopped at 06/12/19 1133   levETIRAcetam (KEPPRA) 500 mg in 0.9% sodium chloride 100 mL IVPB, 500 mg, IntraVENous, Q12H, McBurney, Willodean Sinner, MD, Last Rate: 400 mL/hr at 06/13/19 1004, 500 mg at 06/13/19 1004   dexamethasone (DECADRON) 4 mg/mL injection 4 mg, 4 mg, IntraVENous, Q6H, Mandi Mitchell NP, 4 mg at 06/13/19 6155   famotidine (PF) (PEPCID) 20 mg in sodium chloride 0.9% 10 mL injection, 20 mg, IntraVENous, Q12H, Geri Martinez MD, 20 mg at 06/13/19 0948 
  NUTRITIONAL SUPPORT ELECTROLYTE PRN ORDERS, , Does Not Apply, PRN, Merlin Patterson MD 
  sodium chloride (NS) flush 30 mL, 30 mL, InterCATHeter, Q8H, Geri Martinez MD, 30 mL at 06/13/19 4757   heparin (porcine) pf 900 Units, 900 Units, InterCATHeter, Q8H, Geri Martinez MD, 900 Units at 06/13/19 0520 
  sodium chloride (NS) flush 30 mL, 30 mL, InterCATHeter, PRN, Geri Martinez MD 
  heparin (porcine) pf 900 Units, 900 Units, InterCATHeter, PRN, Geri Martinez MD 
  ondansetron [facility]) injection 4 mg, 4 mg, IntraVENous, Q4H PRN, Tramaine BRYANT NP, 4 mg at 06/11/19 2032 
  fentaNYL citrate (PF) injection 25 mcg, 25 mcg, IntraVENous, Q4H PRN, Merlin Patterson MD, 25 mcg at 06/09/19 0024 Recent Results (from the past 12 hour(s)) POC G3 Collection Time: 06/13/19  3:15 AM  
Result Value Ref Range Device: VENT    
 FIO2 (POC) 25 % pH (POC) 7.542 (H) 7.35 - 7.45    
 pCO2 (POC) 21.9 (L) 35 - 45 MMHG  
 pO2 (POC) 114 (H) 75 - 100 MMHG  
 HCO3 (POC) 18.8 (L) 22 - 26 MMOL/L  
 sO2 (POC) 99 (H) 95 - 98 % Base deficit (POC) 2 mmol/L Mode Pressure regulated volume control Tidal volume 450 ml Set Rate 15 bpm  
 PEEP/CPAP (POC) 8 cmH2O Allens test (POC) YES Inspiratory Time 0.9 sec Site RIGHT RADIAL Patient temp. 98.6 Specimen type (POC) ARTERIAL Performed by Rut   
 CO2, POC 19 MMOL/L Respiratory comment: NurseNotified Exhaled minute volume 5.80 L/min COLLECT TIME 309 CBC WITH AUTOMATED DIFF Collection Time: 06/13/19  4:26 AM  
Result Value Ref Range WBC 6.3 4.3 - 11.1 K/uL  
 RBC 3.84 (L) 4.05 - 5.2 M/uL  
 HGB 11.0 (L) 11.7 - 15.4 g/dL HCT 32.7 (L) 35.8 - 46.3 % MCV 85.2 79.6 - 97.8 FL  
 MCH 28.6 26.1 - 32.9 PG  
 MCHC 33.6 31.4 - 35.0 g/dL  
 RDW 14.3 11.9 - 14.6 % PLATELET 441 512 - 748 K/uL MPV 9.7 9.4 - 12.3 FL ABSOLUTE NRBC 0.00 0.0 - 0.2 K/uL  
 DF AUTOMATED NEUTROPHILS 91 (H) 43 - 78 % LYMPHOCYTES 4 (L) 13 - 44 % MONOCYTES 4 4.0 - 12.0 % EOSINOPHILS 0 (L) 0.5 - 7.8 % BASOPHILS 0 0.0 - 2.0 % IMMATURE GRANULOCYTES 0 0.0 - 5.0 %  
 ABS. NEUTROPHILS 5.7 1.7 - 8.2 K/UL  
 ABS. LYMPHOCYTES 0.3 (L) 0.5 - 4.6 K/UL  
 ABS. MONOCYTES 0.3 0.1 - 1.3 K/UL  
 ABS. EOSINOPHILS 0.0 0.0 - 0.8 K/UL  
 ABS. BASOPHILS 0.0 0.0 - 0.2 K/UL  
 ABS. IMM. GRANS. 0.0 0.0 - 0.5 K/UL METABOLIC PANEL, BASIC Collection Time: 06/13/19  4:26 AM  
Result Value Ref Range Sodium 142 136 - 145 mmol/L Potassium 3.6 3.5 - 5.1 mmol/L Chloride 110 (H) 98 - 107 mmol/L  
 CO2 25 21 - 32 mmol/L Anion gap 7 7 - 16 mmol/L Glucose 137 (H) 65 - 100 mg/dL BUN 8 8 - 23 MG/DL Creatinine 0.18 (L) 0.6 - 1.0 MG/DL  
 GFR est AA >60 >60 ml/min/1.73m2 GFR est non-AA >60 >60 ml/min/1.73m2 Calcium 8.4 8.3 - 10.4 MG/DL Physical Exam: 
General - Well developed, well nourished, in no apparent distress. Cardiovascular - Regular rate and rhythm. Normal S1, S2 without murmurs, rubs, or gallops. Lungs - Clear to auscultation. Abdomen - Soft, nontender with normal bowel sounds. Extremities - Peripheral pulses intact. Left arm cool. No edema and no rashes.  
  
Neurological examination 
  
Level of consciousness- drowsy, able to follow commands, intubated 
  
Brain stem reflexes 
-Pupillary reflex to bright light: 3mm PERRL, brisk, right gaze palsy 
-Ciliospinal reflexes: present 
-Oculovestibular and/or oculocephalic reflex response:na 
-Corneal reflex: present 
-Facial movement to painful stimulus at supraorbital nerve, temporomandibular joint:na 
-Cough/gag reflex: present 
  
Motor examination -Muscle tone: decreased muscle tone with atrophy in all extremities,  
-Motor response to pain: Moves extremities to command  
-Muscle stretch reflexes: n/a 
-Response to plantar stimulation: n/a Signed By: Stephanie Christian NP   
 June 13, 2019 Attending note Evident metastatic current situation reflects that the patient has disease intracranially with an unknown primary. There is the possibility that this may represent lymphoma. I did have a discussion with daughter today with regards to the possibility that the intracranial process might reflect something manageable in the short to intermediate term but by no means curable but that based upon the quality of life issues prior to the patient's acute illness would obviously determine whether this was appropriate for not. I did indicate that the next step would be a lumbar puncture were we to proceed aggressively. At this juncture family wishes to pursue a conservative palliative care approach I agree with that. With this being the case we will sign off

## 2019-06-13 NOTE — PROGRESS NOTES
Ventilator check complete; patient has a #7. 0 ET tube secured at the 22 at the teeth. Patient is not sedated. Patient is able to follow minimal commands. Breath sounds are coarse. Trachea is midline, Negative for subcutaneous air, and chest excursion is symmetric. Patient is also Negative for cyanosis and is Positive for pitting edema. All alarms are set and audible. Resuscitation bag is at the head of the bed. Ventilator Settings Mode FIO2 Rate Tidal Volume Pressure PEEP I:E Ratio PRVC  26 %    450 ml  8 cm H2O  8 cm H20  1:3.45 Peak airway pressure: 20.3 cm H2O Minute ventilation: 8.2 l/min ABG: No results for input(s): PH, PCO2, PO2, HCO3 in the last 72 hours.  
 
 
Jose Mcgee, RT

## 2019-06-13 NOTE — PROGRESS NOTES
A follow up visit was made to the patient. Emotional support, spiritual presence and  
prayer were provided. The patient has now been extubated. Her daughter and  are with her. Patient  with her  and daughter by the bedside. Her family preferred privacy. Dee Yu

## 2019-06-13 NOTE — DISCHARGE SUMMARY
Death Summary Ramesh Min Admission date:  6/8/2019 Discharge date:  6/13/2019 Admitting Diagnosis:  Altered mental status [R41.82] Discharge Diagnosis:  Acute encephalopathy with brain lesions Problem List as of 6/13/2019 Date Reviewed: 6/10/2019 Codes Class Noted - Resolved Acute respiratory failure ([facility] [address] ICD-10-CM: J96.00 
ICD-9-CM: 518.81  6/8/2019 - Present Cerebral palsy (HCC) (Chronic) ICD-10-CM: G80.9 ICD-9-CM: 343.9  6/8/2019 - Present * (Principal) Acute encephalopathy ICD-10-CM: G93.40 ICD-9-CM: 348.30  6/8/2019 - Present Cardiac arrest [facility]) ICD-10-CM: I46.9 ICD-9-CM: 427.5  6/8/2019 - Present Hypotension ICD-10-CM: I95.9 ICD-9-CM: 458.9  6/8/2019 - Present Pneumothorax on right ICD-10-CM: J93.9 ICD-9-CM: 512.89  6/8/2019 - Present Consultants: 
Neurology Palliative Care Hematology/Oncology Studies/Procedures: 
Brain MRI 6/10: Impression: 1. At least 2 right parieto-occipital isointense T2, peripherally enhancing 
lesions with with surrounding vasogenic edema are demonstrated highly suspicious 
for adenocarcinoma, with resulting in mass effect and compression of the right 
ventricular trigone. 2. Additional third small peripherally enhancing lesion is demonstrated in the 
deep white matter of the right parietal lobe at the gray-white junction. 3. Remote left frontoparietal craniotomy changes with postoperative cavity along 
the posterior left frontal convexity and surrounding gliosis. 4. Fluid signal throughout mastoid air cells, left greater than right. CT Chest/Abdomen/Pelvis: 6/13/2019 IMPRESSION:  
1. No evidence of neoplasm in the chest, abdomen or pelvis. 2. Tiny right pneumothorax, in this patient with an indwelling right-sided chest 
tube. There are also loculated pneumothoraces along the medial left lung, as 
well as mild bibasilar lung atelectasis and tiny bilateral pleural effusions. 3. Mild ascites and colonic diverticulosis. There is also probable focal fat 
along the falciform ligament of the liver. Hospital course: Ms. Obdulia Jacobo is a 77 y.o. female admitted on 6/8/2019. PMH: musclar dystrophy, afib. She presented to the ER with concern for stroke, change in mental status, decreased responsiveness. She was intubated in the ER upon arrival for airway protection. She ultimately went into asystole requiring CPR. She was admitted to ICU. CT head showed changed in right posterior frontal lobe, posterior temporal lobe, and right occipital lobe concerning for mass. MRI brain was done and showed right parieto-occipital isointense T2, peripherally enhancing lesions with with surrounding vasogenic edema are demonstrated highly suspicious for adenocarcinoma, with resulting in mass effect and compression of the right ventricular trigone. She was evaluated by neurosurgery and no intervention was indicated. Neurology saw patient and started high dose decadron. No primary cancer source was identified with CT chest/abdomen/pelvis. After discussion with oncology, neurology and palliative care the family decided to make given patients limited functional status, quality of life prior to admission now with new brain lesions, no way to determine etiology without brain biopsy and continued mental status decline. Final: 
--Pronounced dead at 1445 on 13 June 2019. --Total discharge greater than 30 minutes in duration.  
 
Carline Tejada MD

## 2019-06-13 NOTE — PROGRESS NOTES
Critical Care Daily Progress Note: 6/13/2019 Rodney Dawn Admission Date: 6/8/2019 The patient's chart is reviewed and the patient is discussed with the staff. 
 
68 y.o.  female presents with Altered mental status, unspecified altered mental status type: new and requires workup Diagnosis management comments: EMS originally called out for stroke, change in mental status, describes rightward gaze, drooling, decreased responsiveness.  Transported to Hospitalfor suspected stroke, noted sudden decrease in patient's mentation just outside of hospital and elects today.  On arrival, patient is minimally responsive to painful stimuli with gurgling respirations  Concerned that patient may not be able to protect her airway.  She was intubated in the ER and sent for ct of head but upon arrival she developed bradycardia the asystole. CPR was initiated and only 1 epi was given with compressions for a a couple of minutes Subjective:  
 
Minimally responsive. Ventilated. Not on sedation. PC has met with family and pending transition to comfort care measures this afternoon when they return. Current Facility-Administered Medications Medication Dose Route Frequency  morphine injection 2 mg  2 mg IntraVENous Q15MIN PRN  
 morphine injection 4 mg  4 mg IntraVENous Q15MIN PRN  
 morphine 10 mg/ml injection 10 mg  10 mg IntraVENous Q15MIN PRN  
 LORazepam (ATIVAN) injection 2 mg  2 mg IntraVENous Q2H PRN  
 glycopyrrolate (ROBINUL) injection 0.4 mg  0.4 mg IntraVENous Q6H PRN  
 dilTIAZem (CARDIZEM) 125 mg in dextrose 5% 125 mL infusion  0-15 mg/hr IntraVENous TITRATE  levETIRAcetam (KEPPRA) 500 mg in 0.9% sodium chloride 100 mL IVPB  500 mg IntraVENous Q12H  
 sodium chloride (NS) flush 30 mL  30 mL InterCATHeter Q8H  
 sodium chloride (NS) flush 30 mL  30 mL InterCATHeter PRN  
 ondansetron (ZOFRAN) injection 4 mg  4 mg IntraVENous Q4H PRN Review of Systems Unobtainable due to patient status. Objective:  
 
Vitals:  
 06/13/19 0901 06/13/19 0931 06/13/19 1001 06/13/19 1102 BP: 108/69 138/77 118/70 131/84 Pulse: 66 70 68 67 Resp: 15 16 15 17 Temp:      
SpO2: 100% 100% 100% 100% Weight:      
Height:      
 
Intake and Output:  
06/11 1901 - 06/13 0700 In: 3122.5 [I.V.:1702.5] Out: 1949 [[facility]:[id number]] No intake/output data recorded. Ventilator Settings Mode FIO2 Rate Tidal Volume Pressure PEEP PRVC  26 %    450 ml  8 cm H2O  8 cm H20 Peak airway pressure: 20.3 cm H2O Minute ventilation: 8.2 l/min ABG:  
Recent Labs  
  06/13/19 
0315 06/12/19 
0254 06/11/19 
0356 PHI 7.542* 7.320* 7.373 PCO2I 21.9* 29.2* 22.0*  
PO2I 114* 133* 137* HCO3I 18.8* 15.1* 12.8* Physical Exam:         
Constitutional:  the patient is well developed and in no acute distress EENMT:  Sclera clear, oral mucosa moist 
Respiratory: Intubated and mechanically ventilated,  
Cardiovascular:  RRR without M,G,R 
Gastrointestinal: soft and non-tender; with positive but low bowel sounds. Musculoskeletal: cool without cyanosis. There is no lower extremity edema. Skin:  no jaundice or rashes, no wounds Neurologic: Intubated and mechanically ventilated, following simple commands -- moving Right side hand and toes more then left. Psychiatric:  Minimally responsive LINES:  ETT, Right PICC line, Bryant catheter, NGT, right Chest tube DRIPS: 
Dilt: off CXR: 6/12L: stable chest 
 
 
6/11/19 with ETT noted and less congestion in right chest vs yesterday 6/10/2019 IMPRESSION:  Unchanged mid left lung atelectasis. MRI BRAIN W/WO CONTRAST:  6/10/2019 IMPRESSION 1. At least 2 right parieto-occipital isointense T2, peripherally enhancing 
lesions with with surrounding vasogenic edema are demonstrated highly suspicious 
for adenocarcinoma, with resulting in mass effect and compression of the right 
ventricular trigone. 2. Additional third small peripherally enhancing lesion is demonstrated in the 
deep white matter of the right parietal lobe at the gray-white junction. 3. Remote left frontoparietal craniotomy changes with postoperative cavity along 
the posterior left frontal convexity and surrounding gliosis. 4. Fluid signal throughout mastoid air cells, left greater than right. LAB Recent Labs  
  06/11/19 
1655 GLUCPOC 95 Recent Labs  
  06/13/19 
0426 06/12/19 
0416 06/11/19 
0423 WBC 6.3 7.9 6.1 HGB 11.0* 10.7* 11.4* HCT 32.7* 32.7* 34.5*  
 227 168 Recent Labs  
  06/13/19 
0426 06/12/19 
0416 06/11/19 
1845 06/11/19 
0423  141  --  141  
K 3.6 3.4*  --  3.8 * 112*  --  111* CO2 25 18*  --  18* * 130*  --  95 BUN 8 10  --  10  
CREA 0.18* 0.18*  --  <0.15* MG  --  2.1 2.0  --   
PHOS  --  1.4*  --   --   
CA 8.4 7.9*  --  8.5 Recent Labs  
  06/13/19 
0315 06/12/19 
0254 06/11/19 
0356 PHI 7.542* 7.320* 7.373 PCO2I 21.9* 29.2* 22.0*  
PO2I 114* 133* 137* HCO3I 18.8* 15.1* 12.8* No results for input(s): LCAD, LAC in the last 72 hours. No results for input(s): PH, PCO2, PO2, HCO3 in the last 72 hours. Assessment:  (Medical Decision Making) Hospital Problems  Date Reviewed: 6/10/2019 Codes Class Noted POA Acute respiratory failure ([facility] [address] ICD-10-CM: J96.00 
ICD-9-CM: 518.81  6/8/2019 Unknown Intubated and mechanically ventilaed Cerebral palsy (HCC) (Chronic) ICD-10-CM: G80.9 ICD-9-CM: 343.9  6/8/2019 Yes Chronic Acute encephalopathy ICD-10-CM: G93.40 ICD-9-CM: 348.30  6/8/2019 Unknown MRI done today Cardiac arrest [facility]) ICD-10-CM: I46.9 ICD-9-CM: 427.5  6/8/2019 Unknown Mechanically ventilated, no command following Hypotension ICD-10-CM: I95.9 ICD-9-CM: 458.9  6/8/2019 Unknown Pressors off at this time Pneumothorax on right ICD-10-CM: J93.9 ICD-9-CM: 512.89  6/8/2019 Unknown Right chest tube intact Patient admitted with change in mental status and intubated. Was hypotensive. Also with spontaneous right sided PTX with chest tube placed. CT negative. EEG negative. MRI noted brain lesions. Evaluating if mets or not. Mental status is slow to recover Plan:  (Medical Decision Making) Agree with plan for comfort care measures given patients limited functional status, quality of life prior to admission now with new brain lesions, no way to determine etiology without brain biopsy and continued mental status decline. Will continue with plan for comfort measures and palliative extubation this afternoon when family returns from visiting with her son. More than 50% of the time documented was spent in face-to-face contact with the patient and in the care of the patient on the floor/unit where the patient is located.  
 
Ean Butcher MD

## 2019-06-13 NOTE — PROGRESS NOTES
A follow up visit was made to the patient. Emotional support, spiritual presence and  
prayer were provided. [employer]

## 2019-06-13 NOTE — PROGRESS NOTES
Family at bedside, Dr. Aurora Duenas updated family on CT results, family wishes to pursue palliative care at this time.

## 2019-06-13 NOTE — PROGRESS NOTES
[facility] Hematology & Oncology Inpatient Hematology / Oncology Progress NoteAdmission Date: 2019  2:09 PM 
Reason for Admission/Hospital Course: Altered mental status [R41.82] 24 Hour Events: 
Family opting for comfort care Remains intubated ROS: 
Unable to obtain: patient unresponsive Allergies Allergen Reactions  Morphine Other (comments) \"cardiac arrest\" OBJECTIVE: 
Patient Vitals for the past 8 hrs: 
 BP Temp Pulse Resp SpO2  
19 1306 126/67  65 16 100 % 19 1230 109/85  65 14 100 % 19 1201 114/71 98.3 °F (36.8 °C) 67 16 99 % 19 1130 125/73  69 15 100 % 19 1119   66 18 100 % 19 1102 131/84  67 17 100 % 19 1001 118/70  68 15 100 % 19 0931 138/77  70 16 100 % 19 0901 108/69  66 15 100 % 19 0830 93/66  65 15 100 % 19 0801 133/72  66 22 99 % 19 0759   86 17 100 % 19 0730 118/78  71 18 100 % 19 0701 125/70 98.9 °F (37.2 °C) 66 13 100 % Temp (24hrs), Av.5 °F (36.9 °C), Min:98 °F (36.7 °C), Max:99 °F (37.2 °C) No intake/output data recorded. Physical Exam: 
Constitutional: Ill appearing female in no acute distress, lying in bed HEENT: Normocephalic and atraumatic Lymph node Deferred Skin Warm and dry. Respiratory  Lungs clear CVS Normal rate, regular rhythm and normal S1 and S2. No murmurs, gallops, or rubs. Abdomen Soft, nontender and nondistended, normoactive bowel sounds. No palpable mass. Neuro Unresponsive MSK   No edema and no tenderness. Labs: 
   
Recent Labs  
  19 
0426 19 
0416 19 
0423 WBC 6.3 7.9 6.1  
RBC 3.84* 3.73* 3.96* HGB 11.0* 10.7* 11.4* HCT 32.7* 32.7* 34.5* MCV 85.2 87.7 87.1 MCH 28.6 28.7 28.8 MCHC 33.6 32.7 33.0  
RDW 14.3 14.4 14.4  227 168 GRANS 91* 92* 92* LYMPH 4* 4* 7*  
MONOS 4 4 1* EOS 0* 0* 0*  
BASOS 0 0 0 IG 0 1 0  
 DF AUTOMATED AUTOMATED AUTOMATED ANEU 5.7 7.2 5.6 ABL 0.3* 0.3* 0.4* ABM 0.3 0.3 0.1 TAMMY 0.0 0.0 0.0 ABB 0.0 0.0 0.0 AIG 0.0 0.0 0.0 Recent Labs  
  06/13/19 
0426 06/12/19 
0416 06/11/19 
1845 06/11/19 
0423  141  --  141  
K 3.6 3.4*  --  3.8 * 112*  --  111* CO2 25 18*  --  18* AGAP 7 11  --  12  
* 130*  --  95 BUN 8 10  --  10  
CREA 0.18* 0.18*  --  <0.15* GFRAA >60 >60  --  0* GFRNA >60 >60  --  0*  
CA 8.4 7.9*  --  8.5 MG  --  2.1 2.0  --   
PHOS  --  1.4*  --   --   
 
 
 
Imaging: 
 
Medications: 
Current Facility-Administered Medications Medication Dose Route Frequency  morphine injection 2 mg  2 mg IntraVENous Q15MIN PRN  
 morphine injection 4 mg  4 mg IntraVENous Q15MIN PRN  
 morphine 10 mg/ml injection 10 mg  10 mg IntraVENous Q15MIN PRN  
 LORazepam (ATIVAN) injection 2 mg  2 mg IntraVENous Q2H PRN  
 glycopyrrolate (ROBINUL) injection 0.4 mg  0.4 mg IntraVENous Q6H PRN  
 dilTIAZem (CARDIZEM) 125 mg in dextrose 5% 125 mL infusion  0-15 mg/hr IntraVENous TITRATE  levETIRAcetam (KEPPRA) 500 mg in 0.9% sodium chloride 100 mL IVPB  500 mg IntraVENous Q12H  
 sodium chloride (NS) flush 30 mL  30 mL InterCATHeter Q8H  
 sodium chloride (NS) flush 30 mL  30 mL InterCATHeter PRN  
 ondansetron (ZOFRAN) injection 4 mg  4 mg IntraVENous Q4H PRN  
 
 
ASSESSMENT: 
 
Problem List  Date Reviewed: 6/10/2019 Codes Class Noted Acute respiratory failure ([facility] [address] ICD-10-CM: J96.00 
ICD-9-CM: 518.81  6/8/2019 Cerebral palsy (HCC) (Chronic) ICD-10-CM: G80.9 ICD-9-CM: 343.9  6/8/2019 * (Principal) Acute encephalopathy ICD-10-CM: G93.40 ICD-9-CM: 348.30  6/8/2019 Cardiac arrest [facility]) ICD-10-CM: I46.9 ICD-9-CM: 427.5  6/8/2019 Hypotension ICD-10-CM: I95.9 ICD-9-CM: 458.9  6/8/2019 Pneumothorax on right ICD-10-CM: J93.9 ICD-9-CM: 512.89  6/8/2019 PLAN: 
Brain mass - Concern for metastatic disease. CT CAP pending. Continue high dose decadron. Neurology following. Consult rad-onc. Will need tissue to confirm diagnosis - TBD based on findings from CT scan 6/13 CT CAP without evidence of primary site. Family has opted for comfort 
  
Focal seizures - On keppra per neurology 
  
Pneumothorax - Right chest tube. Pulmonology following 
  
 
Goals and plan of care reviewed with the patient. All questions answered to the best of our ability. Nothing further to add from heme-onc. We will sign off 
 
     
 
Saud Boucher NP [facility] Hematology & Oncology [id number] [address] Office : [phone] Fax : [phone] I personally saw, exammed and counselled the patient, and discussed with NP, agree with above history/assessment/plan. 77 y. o.female h/o meningioma s/p craniotomy went to ER with AMS and was coded, responded to CPR and intubated, head CT and MRI showed multiple brain lesions, neurosurgery rec no surgical intervention, decadron for symptoms control, CT for complete staging and found no extra-cranial primary site, consult rad onc once stable for transportation, seizure control and pneumo rx. However pt made no progress and family aimed for comfort care. 
Xiomara Singh M.D. [facility] [id number] [place] [address] Office : [phone] Fax : [phone]

## 2019-06-13 NOTE — PROGRESS NOTES
Chart reviewed and pt discussed in am IDR. Pt remains min responsive and intubated in CCU. Palliative care following and MD has discussed possible comfort care with family.  CM will continue to follow for any assist.